# Patient Record
Sex: MALE | Race: WHITE
[De-identification: names, ages, dates, MRNs, and addresses within clinical notes are randomized per-mention and may not be internally consistent; named-entity substitution may affect disease eponyms.]

---

## 2018-03-23 ENCOUNTER — HOSPITAL ENCOUNTER (INPATIENT)
Dept: HOSPITAL 52 - LL.ED | Age: 48
LOS: 2 days | Discharge: HOME | DRG: 425 | End: 2018-03-25
Attending: FAMILY MEDICINE | Admitting: FAMILY MEDICINE
Payer: COMMERCIAL

## 2018-03-23 DIAGNOSIS — E87.1: Primary | ICD-10-CM

## 2018-03-23 DIAGNOSIS — Z79.899: ICD-10-CM

## 2018-03-23 DIAGNOSIS — F10.10: ICD-10-CM
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
## 2018-03-23 DIAGNOSIS — Z88.8: ICD-10-CM

## 2018-03-23 LAB
CHLORIDE SERPL-SCNC: 83 MMOL/L (ref 98–107)
CHLORIDE SERPL-SCNC: 85 MMOL/L (ref 98–107)
SODIUM SERPL-SCNC: 118 MMOL/L (ref 136–145)
SODIUM SERPL-SCNC: 119 MMOL/L (ref 136–145)

## 2018-03-23 PROCEDURE — G0480 DRUG TEST DEF 1-7 CLASSES: HCPCS

## 2018-03-23 RX ADMIN — ONDANSETRON PRN MG: 4 TABLET, ORALLY DISINTEGRATING ORAL at 17:36

## 2018-03-23 RX ADMIN — MOMETASONE FUROATE AND FORMOTEROL FUMARATE DIHYDRATE SCH PUFF: 100; 5 AEROSOL RESPIRATORY (INHALATION) at 17:36

## 2018-03-23 NOTE — EDM.PDOC
ED HPI GENERAL MEDICAL PROBLEM





- General


Chief Complaint: Neuro Symptoms/Deficits


Stated Complaint: thinks having a stroke L) arm numbness tingling


Time Seen by Provider: 03/23/18 11:24


Source of Information: Reports: Patient


History Limitations: Reports: Intoxication (question)





- History of Present Illness


INITIAL COMMENTS - FREE TEXT/NARRATIVE: 


Patient is a 48-year-old who was brought in private vehicle with chief 

complaint of left side arm numbness neck numbness and left leg numbness, 

patient states that he was loading the  when all this happened he did 

have some nausea, patient stated to the nurses that he thought he was having a 

stroke patient has history of Bell's palsy on the left side he has some 

weakness of lips and his tongue is slightly deviated to the left at this time 

he has negative drift strength is equal bilaterally speech is clear and 

ambulated into the hospital on his own power.


Symptoms started 1 hour prior to arrival


Onset: Sudden (This morning)


Duration: Improving


Location: Reports: Chest, Upper Extremity, Left, Lower Extremity, Left, 

Radiates to (arm left)


Quality: Reports: Other (Numbness)


Severity: Mild


Improves with: Reports: Rest


Worsens with: Reports: None


Context: Reports: Other


Associated Symptoms: Reports: Nausea/Vomiting





- Related Data


 Allergies











Allergy/AdvReac Type Severity Reaction Status Date / Time


 


naproxen [From Aleve] Allergy  Other Verified 03/23/18 11:37











Home Meds: 


 Home Meds





Celecoxib [CeleBREX] 200 mg PO DAILY 01/27/15 [History]


Cholecalciferol (Vitamin D3) [Vitamin D3] 2,000 units PO DAILY 01/27/15 [History

]


Cyanocobalamin (Vitamin B12) [Vitamin B12] 100 mg PO DAILY 01/27/15 [History]


Ferrous Sulfate [Iron] 325 mg PO DAILY 01/27/15 [History]


Hydrocodone/Acetaminophen [Hydrocodon-Acetaminophen 5-325] 1 - 2 tab PO Q6HR 

PRN 01/27/15 [History]


Multivitamin [Daily Vitamin] 1 tab PO DAILY 01/27/15 [History]


Omeprazole [Prilosec] 1 cap PO DAILY 03/13/15 [History]


ALPRAZolam [Alprazolam] 0.5 mg PO BID 09/16/16 [History]


Gabapentin [Neurontin] 600 mg PO TID 09/16/16 [History]


fentaNYL [Fentanyl] 1 each TD Q3D 09/16/16 [History]











Social & Family History





- Tobacco Use


Smoking Status *Q: Never Smoker


Second Hand Smoke Exposure: No





- Alcohol Use


Days Per Week of Alcohol Use: 3


Number of Drinks Per Day: 10


Total Drinks Per Week: 30





- Recreational Drug Use


Recreational Drug Use: No





ED ROS GENERAL





- Review of Systems


Review Of Systems: See Below


Constitutional: Reports: No Symptoms


HEENT: Reports: No Symptoms


Respiratory: Reports: No Symptoms (History of shortness of breath COPD)


Cardiovascular: Reports: Other (Chest pressure)


Endocrine: Reports: No Symptoms


GI/Abdominal: Reports: No Symptoms


: Reports: No Symptoms


Musculoskeletal: Reports: Neck Pain (Left), Shoulder Pain (Left), Hand Pain (

Left), Leg Pain (Left), Other (Described more of a numbness and pain)


Skin: Reports: No Symptoms


Neurological: Reports: Numbness (Neck left arm and and left leg), Change in 

Speech (States that at home he did have some slurred speech that was noticed by 

his son speech now is clear)


Psychiatric: Reports: No Symptoms


Hematologic/Lymphatic: Reports: No Symptoms


Immunologic: Reports: No Symptoms


Free Text/Narrative/Comment: 





Patient's symptoms seem to be improving they wax and wane





ED EXAM, NEURO





- Physical Exam


Exam: See Below


Exam Limited By: Intoxication (Spells like alcohol)


General Appearance: Alert, WD/WN, No Apparent Distress, Anxious, Mild Distress


Eye Exam: Bilateral Eye: EOMI, PERRL


Ears: Normal External Exam, Normal Canal, Hearing Grossly Normal, Normal TMs


Nose: Normal Inspection, Normal Mucosa, No Blood


Throat/Mouth: Normal Inspection, Normal Lips, Normal Teeth, Normal Gums, Normal 

Oropharynx, Normal Voice, No Airway Compromise


Head Exam: Atraumatic, Normocephalic


Neck: Normal Inspection, Supple, Non-Tender, Full Range of Motion


Respiratory/Chest: No Respiratory Distress, Lungs Clear, Normal Breath Sounds, 

No Accessory Muscle Use, Chest Non-Tender


Cardiovascular: Normal Peripheral Pulses, Regular Rate, Rhythm, No Edema, No 

Gallop, No JVD, No Murmur, No Rub


GI/Abdominal: Normal Bowel Sounds, Soft, Non-Tender, No Organomegaly, No 

Distention, No Abnormal Bruit, No Mass


 (Male) Exam: Deferred


Rectal (Males) Exam: Deferred


Neurological: Alert, Normal Mood/Affect, Normal Dorsiflexion, CN II-XII Intact, 

Normal Plantar Flexion, Normal Gait, Normal Reflexes, No Motor/Sensory Deficits

, Oriented x 3


Back Exam: Normal Inspection, Full Range of Motion, NT


Extremities: Normal Inspection, Normal Range of Motion, Non-Tender, No Pedal 

Edema, Normal Capillary Refill


Psychiatric: Normal Affect, Normal Mood


Skin Exam: Warm, Dry, Intact, Normal Color, No Rash





Course





- Orders/Labs/Meds


Orders: 


 Active Orders 24 hr











 Category Date Time Status


 


 EKG Documentation Completion [RC] ASDIRECTED Care  03/23/18 11:42 Active


 


 Chest 1V Frontal [CR] Stat Exams  03/23/18 11:43 Taken


 


 Head wo Cont [CT] Routine Exams  03/23/18 11:30 Taken


 


 CREATININE, URINE RAND/24 HR Stat Lab  03/23/18 12:23 Ordered


 


 OSMOLALITY - URINE Stat Lab  03/23/18 12:19 Ordered


 


 PROLACTIN [REF] Stat Lab  03/23/18 11:27 Received


 


 SODIUM, URINE RAND/24HR Stat Lab  03/23/18 12:19 Ordered


 


 Sodium Chloride 0.9% [Normal Saline] 1,000 ml Med  03/23/18 12:00 Active





 IV ASDIRECTED   








 Medication Orders





Sodium Chloride (Normal Saline)  1,000 mls @ 150 mls/hr IV ASDIRECTED TAISHA


   Last Admin: 03/23/18 12:05  Dose: 150 mls/hr








Labs: 


 Laboratory Tests











  03/23/18 03/23/18 03/23/18 Range/Units





  10:55 11:27 11:27 


 


WBC   3.3 L   (4.0-10.2)  K/uL


 


RBC   3.73 L   (4.33-5.41)  M/uL


 


Hgb   11.2 L D   (13.1-16.8)  g/dL


 


Hct   33.0 L   (39.0-49.0)  %


 


MCV   88.5  D   (84.0-98.0)  fL


 


MCH   30.0   (28.2-33.3)  pg


 


MCHC   33.9   (31.7-36.0)  g/dL


 


RDW   16.1 H   (11.2-14.1)  %


 


Plt Count   191   (150-350)  K/uL


 


Neut % (Auto)   55.2   (45.0-80.0)  %


 


Lymph % (Auto)   24.4   (10.0-50.0)  %


 


Mono % (Auto)   18.6 H   (2.0-14.0)  %


 


Eos % (Auto)   0.9   (0.0-5.0)  %


 


Baso % (Auto)   0.9   (0.0-2.0)  %


 


Neut # (Auto)   1.81   (1.40-7.00)  K/uL


 


Lymph # (Auto)   0.80   (0.50-3.50)  K/uL


 


Mono # (Auto)   0.61   (0.00-1.00)  K/uL


 


Eos # (Auto)   0.03   (0.00-0.50)  K/uL


 


Baso # (Auto)   0.03   (0.00-0.20)  K/uL


 


PT    10.3  (9.8-11.7)  SEC


 


INR    1.0  


 


APTT    29.3  (22.1-29.8)  SEC


 


D-Dimer, Quantitative     (0-400)  ng/mL


 


Sodium     (136-145)  mmol/L


 


Potassium     (3.5-5.1)  mmol/L


 


Chloride     ()  mmol/L


 


Carbon Dioxide     (21.0-32.0)  mmol/L


 


BUN     (7-18)  mg/dL


 


Creatinine     (0.51-1.17)  mg/dL


 


Est Cr Clr Drug Dosing     


 


Estimated GFR (MDRD)     mL/min


 


Glucose     ()  mg/dL


 


POC Glucose     ()  mg/dl


 


Calcium     (8.5-10.1)  mg/dL


 


Magnesium     (1.8-2.4)  mg/dL


 


Total Bilirubin     (0.2-1.0)  mg/dL


 


AST     (15-37)  U/L


 


ALT     (12-78)  U/L


 


Alkaline Phosphatase     ()  IU/L


 


Creatine Kinase     ()  U/L


 


Creatine Kinase Index     (0.0-2.5)  %


 


CK-MB (CK-2)     (0.00-3.60)  ng/mL


 


Troponin I     (0.000-0.056)  ng/mL


 


NT-Pro-B Natriuret Pep     (0-125)  pg/mL


 


Total Protein     (6.4-8.2)  g/dL


 


Albumin     (3.4-5.0)  g/dL


 


Ethyl Alcohol  0.228 H    (0.000-0.080)  g/dL














  03/23/18 03/23/18 03/23/18 Range/Units





  11:27 11:27 11:30 


 


WBC     (4.0-10.2)  K/uL


 


RBC     (4.33-5.41)  M/uL


 


Hgb     (13.1-16.8)  g/dL


 


Hct     (39.0-49.0)  %


 


MCV     (84.0-98.0)  fL


 


MCH     (28.2-33.3)  pg


 


MCHC     (31.7-36.0)  g/dL


 


RDW     (11.2-14.1)  %


 


Plt Count     (150-350)  K/uL


 


Neut % (Auto)     (45.0-80.0)  %


 


Lymph % (Auto)     (10.0-50.0)  %


 


Mono % (Auto)     (2.0-14.0)  %


 


Eos % (Auto)     (0.0-5.0)  %


 


Baso % (Auto)     (0.0-2.0)  %


 


Neut # (Auto)     (1.40-7.00)  K/uL


 


Lymph # (Auto)     (0.50-3.50)  K/uL


 


Mono # (Auto)     (0.00-1.00)  K/uL


 


Eos # (Auto)     (0.00-0.50)  K/uL


 


Baso # (Auto)     (0.00-0.20)  K/uL


 


PT     (9.8-11.7)  SEC


 


INR     


 


APTT     (22.1-29.8)  SEC


 


D-Dimer, Quantitative  370    (0-400)  ng/mL


 


Sodium   118 L*   (136-145)  mmol/L


 


Potassium   4.7   (3.5-5.1)  mmol/L


 


Chloride   83 L   ()  mmol/L


 


Carbon Dioxide   23.6   (21.0-32.0)  mmol/L


 


BUN   9   (7-18)  mg/dL


 


Creatinine   0.69   (0.51-1.17)  mg/dL


 


Est Cr Clr Drug Dosing   TNP   


 


Estimated GFR (MDRD)   > 60   mL/min


 


Glucose   91   ()  mg/dL


 


POC Glucose    82  ()  mg/dl


 


Calcium   8.5   (8.5-10.1)  mg/dL


 


Magnesium   1.8   (1.8-2.4)  mg/dL


 


Total Bilirubin   0.5   (0.2-1.0)  mg/dL


 


AST   138 H   (15-37)  U/L


 


ALT   98 H   (12-78)  U/L


 


Alkaline Phosphatase   65   ()  IU/L


 


Creatine Kinase   47   ()  U/L


 


Creatine Kinase Index   2.8 H   (0.0-2.5)  %


 


CK-MB (CK-2)   1.30   (0.00-3.60)  ng/mL


 


Troponin I   0.000   (0.000-0.056)  ng/mL


 


NT-Pro-B Natriuret Pep   32   (0-125)  pg/mL


 


Total Protein   8.4 H   (6.4-8.2)  g/dL


 


Albumin   3.8   (3.4-5.0)  g/dL


 


Ethyl Alcohol     (0.000-0.080)  g/dL











Meds: 


Medications











Generic Name Dose Route Start Last Admin





  Trade Name Freq  PRN Reason Stop Dose Admin


 


Sodium Chloride  1,000 mls @ 150 mls/hr  03/23/18 12:00  03/23/18 12:05





  Normal Saline  IV   150 mls/hr





  ASDIRECTED TAISHA   Administration














Discontinued Medications














Generic Name Dose Route Start Last Admin





  Trade Name Freq  PRN Reason Stop Dose Admin


 


Aspirin  324 mg  03/23/18 11:41  03/23/18 11:44





  Aspirin  PO  03/23/18 11:42  324 mg





  ONETIME ONE   Administration


 


Aspirin  Confirm  03/23/18 11:42  03/23/18 11:48





  Aspirin  Administered  03/23/18 11:43  Not Given





  Dose   





  324 mg   





  .ROUTE   





  .STK-MED ONE   














Departure





- Departure


Time of Disposition: 12:32


Disposition: Admitted As Inpatient 66


Clinical Impression: 


 Hyponatremia








- Discharge Information





- Problem List & Annotations


(1) Hyponatremia


SNOMED Code(s): 18206404


   Code(s): E87.1 - HYPO-OSMOLALITY AND HYPONATREMIA   Status: Acute   Current 

Visit: Yes   





- Problem List Review


Problem List Initiated/Reviewed/Updated: Yes





- My Orders


Last 24 Hours: 


My Active Orders





03/23/18 11:27


PROLACTIN [REF] Stat 





03/23/18 11:30


Head wo Cont [CT] Routine 





03/23/18 11:42


EKG Documentation Completion [RC] ASDIRECTED 





03/23/18 11:43


Chest 1V Frontal [CR] Stat 





03/23/18 12:00


Sodium Chloride 0.9% [Normal Saline] 1,000 ml IV ASDIRECTED 





03/23/18 12:19


OSMOLALITY - URINE Stat 


SODIUM, URINE RAND/24HR Stat 





03/23/18 12:23


CREATININE, URINE RAND/24 HR Stat 














- Assessment/Plan


Admission H&P: Please use this note as an admission H&P


Last 24 Hours: 


My Active Orders





03/23/18 11:27


PROLACTIN [REF] Stat 





03/23/18 11:30


Head wo Cont [CT] Routine 





03/23/18 11:42


EKG Documentation Completion [RC] ASDIRECTED 





03/23/18 11:43


Chest 1V Frontal [CR] Stat 





03/23/18 12:00


Sodium Chloride 0.9% [Normal Saline] 1,000 ml IV ASDIRECTED 





03/23/18 12:19


OSMOLALITY - URINE Stat 


SODIUM, URINE RAND/24HR Stat 





03/23/18 12:23


CREATININE, URINE RAND/24 HR Stat 











Plan: 





Admit patient for corrections of sodium and observation.

## 2018-03-24 LAB
CHLORIDE SERPL-SCNC: 90 MMOL/L (ref 98–107)
CHLORIDE SERPL-SCNC: 94 MMOL/L (ref 98–107)
SODIUM SERPL-SCNC: 126 MMOL/L (ref 136–145)
SODIUM SERPL-SCNC: 129 MMOL/L (ref 136–145)

## 2018-03-24 RX ADMIN — MOMETASONE FUROATE AND FORMOTEROL FUMARATE DIHYDRATE SCH PUFF: 100; 5 AEROSOL RESPIRATORY (INHALATION) at 17:16

## 2018-03-24 RX ADMIN — MOMETASONE FUROATE AND FORMOTEROL FUMARATE DIHYDRATE SCH PUFF: 100; 5 AEROSOL RESPIRATORY (INHALATION) at 07:59

## 2018-03-24 RX ADMIN — ONDANSETRON PRN MG: 4 TABLET, ORALLY DISINTEGRATING ORAL at 07:50

## 2018-03-24 RX ADMIN — VITAMIN D, TAB 1000IU (100/BT) SCH UNITS: 25 TAB at 07:45

## 2018-03-24 NOTE — PCM.PN
- General Info


Date of Service: 03/24/18


Admission Dx/Problem (Free Text): 





Patient admitted with hyponatremia severe treated and sodium improving


Functional Status: Reports: Ambulating





- Review of Systems


General: Reports: Other (Tremors)


HEENT: Reports: No Symptoms


Pulmonary: Reports: No Symptoms


Cardiovascular: Reports: No Symptoms


Gastrointestinal: Reports: No Symptoms


Genitourinary: Reports: No Symptoms


Musculoskeletal: Reports: No Symptoms


Skin: Reports: No Symptoms


Neurological: Reports: Tremors (Secondary to DTs)


Psychiatric: Reports: No Symptoms





- Patient Data


Vitals - Most Recent: 


 Last Vital Signs











Temp  98.3 F   03/24/18 04:00


 


Pulse  102 H  03/24/18 04:00


 


Resp  20   03/24/18 04:00


 


BP  141/76 H  03/24/18 04:00


 


Pulse Ox  96   03/24/18 04:00











Weight - Most Recent: 350 lb


I&O - Last 24 Hours: 


 Intake & Output











 03/23/18 03/24/18 03/24/18





 22:59 06:59 14:59


 


Intake Total 200 2071 


 


Output Total 1250 825 


 


Balance -1050 1246 











Lab Results Last 24 Hours: 


 Laboratory Results - last 24 hr











  03/23/18 03/23/18 03/24/18 Range/Units





  15:50 20:32 07:30 


 


WBC    3.8 L  (4.0-10.2)  K/uL


 


RBC    3.52 L  (4.33-5.41)  M/uL


 


Hgb    10.5 L  (13.1-16.8)  g/dL


 


Hct    31.6 L  (39.0-49.0)  %


 


MCV    89.8  (84.0-98.0)  fL


 


MCH    29.8  (28.2-33.3)  pg


 


MCHC    33.2  (31.7-36.0)  g/dL


 


RDW    16.5 H  (11.2-14.1)  %


 


Plt Count    155  (150-350)  K/uL


 


Neut % (Auto)    81.1 H  (45.0-80.0)  %


 


Lymph % (Auto)    6.9 L  (10.0-50.0)  %


 


Mono % (Auto)    11.4  (2.0-14.0)  %


 


Eos % (Auto)    0.3  (0.0-5.0)  %


 


Baso % (Auto)    0.3  (0.0-2.0)  %


 


Neut # (Auto)    3.06  (1.40-7.00)  K/uL


 


Lymph # (Auto)    0.26 L  (0.50-3.50)  K/uL


 


Mono # (Auto)    0.43  (0.00-1.00)  K/uL


 


Eos # (Auto)    0.01  (0.00-0.50)  K/uL


 


Baso # (Auto)    0.01  (0.00-0.20)  K/uL


 


Sodium  119 L*    (136-145)  mmol/L


 


Potassium  4.6    (3.5-5.1)  mmol/L


 


Chloride  85 L    ()  mmol/L


 


Carbon Dioxide  21.3    (21.0-32.0)  mmol/L


 


BUN  11    (7-18)  mg/dL


 


Creatinine  0.75    (0.51-1.17)  mg/dL


 


Est Cr Clr Drug Dosing  132.21    mL/min


 


Estimated GFR (MDRD)  > 60    mL/min


 


Glucose  83    ()  mg/dL


 


Calcium  8.2 L    (8.5-10.1)  mg/dL


 


Troponin I   0.000   (0.000-0.056)  ng/mL














  03/24/18 Range/Units





  07:30 


 


WBC   (4.0-10.2)  K/uL


 


RBC   (4.33-5.41)  M/uL


 


Hgb   (13.1-16.8)  g/dL


 


Hct   (39.0-49.0)  %


 


MCV   (84.0-98.0)  fL


 


MCH   (28.2-33.3)  pg


 


MCHC   (31.7-36.0)  g/dL


 


RDW   (11.2-14.1)  %


 


Plt Count   (150-350)  K/uL


 


Neut % (Auto)   (45.0-80.0)  %


 


Lymph % (Auto)   (10.0-50.0)  %


 


Mono % (Auto)   (2.0-14.0)  %


 


Eos % (Auto)   (0.0-5.0)  %


 


Baso % (Auto)   (0.0-2.0)  %


 


Neut # (Auto)   (1.40-7.00)  K/uL


 


Lymph # (Auto)   (0.50-3.50)  K/uL


 


Mono # (Auto)   (0.00-1.00)  K/uL


 


Eos # (Auto)   (0.00-0.50)  K/uL


 


Baso # (Auto)   (0.00-0.20)  K/uL


 


Sodium  126 L  (136-145)  mmol/L


 


Potassium  4.6  (3.5-5.1)  mmol/L


 


Chloride  90 L  ()  mmol/L


 


Carbon Dioxide  27.3  (21.0-32.0)  mmol/L


 


BUN  12  (7-18)  mg/dL


 


Creatinine  0.72  (0.51-1.17)  mg/dL


 


Est Cr Clr Drug Dosing  137.72  mL/min


 


Estimated GFR (MDRD)  > 60  mL/min


 


Glucose  98  ()  mg/dL


 


Calcium  8.5  (8.5-10.1)  mg/dL


 


Troponin I   (0.000-0.056)  ng/mL











Med Orders - Current: 


 Current Medications





Albuterol (Proventil Hfa)  2 puff INH Q4HR PRN


   PRN Reason: sob


Amitriptyline HCl (Elavil)  10 mg PO BEDTIME [facility]


   Last Admin: 03/23/18 20:32 Dose:  10 mg


Chlordiazepoxide HCl (Librium)  25 mg PO QID [facility]


   Last Admin: 03/24/18 07:45 Dose:  25 mg


Cholecalciferol (Vitamin D3)  2,000 units PO DAILY [facility]


   Last Admin: 03/24/18 07:45 Dose:  2,000 units


Cyanocobalamin (Vitamin B12)  1,000 mcg PO DAILY [facility]


   Last Admin: 03/24/18 07:45 Dose:  1,000 mcg


Cyclobenzaprine HCl (Flexeril)  5 mg PO TID PRN


   PRN Reason: spasm,pain


Enoxaparin Sodium (Lovenox)  40 mg SUBCUT DAILY [facility]


   Last Admin: 03/24/18 07:46 Dose:  40 mg


Escitalopram Oxalate (Lexapro)  20 mg PO DAILY [facility]


   Last Admin: 03/24/18 07:46 Dose:  20 mg


Folic Acid (Folic Acid)  1 mg PO BEDTIME [facility]


   Last Admin: 03/23/18 20:31 Dose:  1 mg


Furosemide (Lasix)  40 mg IVPUSH DAILY [facility]


   Last Admin: 03/24/18 07:46 Dose:  40 mg


Sodium Chloride (Normal Saline)  1,000 mls @ 150 mls/hr IV ASDIRECTED [facility]


   Last Admin: 03/24/18 02:22 Dose:  150 mls/hr


Mometasone Furoate/Formoterol Fumar (Dulera 100-5 Mcg)  2 puff IH BID [facility]


   Last Admin: 03/24/18 07:59 Dose:  2 puff


Multivitamins/Minerals/Vitamin C (Tab-A-Sudha)  1 tab PO DAILY [facility]


   Last Admin: 03/24/18 07:45 Dose:  1 tab


Olopatadine HCl (Patanol 0.1% Ophth Soln)  0 ml EYEBOTH Q8H PRN


   PRN Reason: irritation


Omeprazole (Omeprazole)  20 mg PO DAILY PRN


   PRN Reason: acid reflex


   Last Admin: 03/24/18 07:50 Dose:  20 mg


Ondansetron HCl (Zofran Odt)  4 mg PO Q8H PRN


   PRN Reason: nausea


   Last Admin: 03/24/18 07:50 Dose:  4 mg


Thiamine HCl (Vitamin B-1)  100 mg PO BEDTIME [facility]


   Last Admin: 03/23/18 20:32 Dose:  100 mg


Tramadol HCl (Ultram)  50 mg PO Q8HR PRN


   PRN Reason: Pain





Discontinued Medications





Aspirin (Aspirin)  324 mg PO ONETIME ONE


   Stop: 03/23/18 11:42


   Last Admin: 03/23/18 11:44 Dose:  324 mg


Aspirin (Aspirin) Confirm Administered Dose 324 mg .ROUTE .STK-MED ONE


   Stop: 03/23/18 11:43


   Last Admin: 03/23/18 11:48 Dose:  Not Given


Furosemide (Lasix)  40 mg IVPUSH ONETIME ONE


   Stop: 03/23/18 12:31


   Last Admin: 03/23/18 12:58 Dose:  40 mg


Furosemide (Lasix) Confirm Administered Dose 40 mg .ROUTE .STK-MED ONE


   Stop: 03/23/18 12:56


   Last Admin: 03/23/18 13:03 Dose:  Not Given


Sodium Chloride (Normal Saline)  1,000 mls @ 150 mls/hr IV ASDIRECTED [facility]


Lorazepam (Ativan)  2 mg IVPUSH ONETIME ONE


   Stop: 03/23/18 20:56


   Last Admin: 03/23/18 21:23 Dose:  2 mg











- Problem List & Annotations


(1) Hyponatremia


SNOMED Code(s): 65563663


   Code(s): E87.1 - HYPO-OSMOLALITY AND HYPONATREMIA   Status: Acute   Current 

Visit: Yes   Annotation/Comment:: We'll continue correcting electrolytes sodium 

today 126 patient feels much better   





(2) Alcohol abuse


SNOMED Code(s): 51182379


   Code(s): F10.10 - ALCOHOL ABUSE, UNCOMPLICATED   Status: Acute   Current 

Visit: Yes   Annotation/Comment:: During the night patient had increase tremors 

history reveals use of alcohol almost on a daily basis patient denies any 

previous seizure activity patient started on vitamins folic acid and thiamine 

also on Librium and Ativan when necessary   





- Problem List Review


Problem List Initiated/Reviewed/Updated: Yes





- My Orders


Last 24 Hours: 


My Active Orders





03/23/18 11:27


OSMOLALITY - SERUM [REF] Stat 





03/23/18 12:30


Telemetry Monitoring [Cardiac Monitoring] [RC] Q2HR 





03/23/18 12:45


Patient Status [ADT] Routine 


Vital Signs [RC] Q4HR 





03/23/18 12:51


Patient Status [ADT] Routine 


Ambulate [RC] ASDIRECTED 


May Shower [RC] ASDIRECTED 


Up With Assistance [RC] ASDIRECTED 


DVT/VTE Prophylaxis Reflex [OM.PC] Routine 





03/23/18 12:52


Intake and Output [RC] QSHIFT 





03/23/18 12:53


Oxygen Therapy [RC] PRN 


Pulse Oximetry [RC] PRN 





03/23/18 12:55


Antiembolic Devices [RC] 08,20 


VTE/DVT Education [RC] PER UNIT ROUTINE 





03/23/18 13:00


Enoxaparin [Lovenox]   40 mg SUBCUT DAILY 





03/23/18 13:01


Albuterol [Proventil HFA]   2 puff INH Q4HR PRN 


Cyclobenzaprine [Flexeril]   5 mg PO TID PRN 


Olopatadine [Patanol 0.1% Ophth Soln]   0 ml EYEBOTH Q8H PRN 


Omeprazole   20 mg PO DAILY PRN 


traMADol [Ultram]   50 mg PO Q8HR PRN 





03/23/18 13:07


Communication Order [RC] 08,14,20 





03/23/18 13:11


Code Status [Resuscitation Status] Routine 





03/23/18 13:30


Ondansetron [Zofran ODT]   4 mg PO Q8H PRN 





03/23/18 18:00


Mometasone/Formoterol [Dulera 100-5 MCG]   2 puff IH BID 





03/23/18 20:00


Amitriptyline [Elavil]   10 mg PO BEDTIME 


Folic Acid   1 mg PO BEDTIME 


Thiamine [Vitamin B-1]   100 mg PO BEDTIME 


chlordiazePOXIDE [Librium]   25 mg PO QID 





03/23/18 20:18


EKG 12 Lead [EK] Routine 





03/23/18 20:46


EKG Documentation Completion [RC] ASDIRECTED 





03/23/18 Lunch


Regular Diet [DIET] 





03/24/18 08:00


Cholecalciferol (Vitamin D3) [Vitamin D3]   2,000 units PO DAILY 


Cyanocobalamin (Vitamin B12) [Vitamin B12]   1,000 mcg PO DAILY 


Escitalopram [Lexapro]   20 mg PO DAILY 


Furosemide [Lasix]   40 mg IVPUSH DAILY 


Multivitamins [Tab-A-Sudha]   1 tab PO DAILY 














- Assessment


Assessment:: 





Overall patient improving will continue replacing sodium will do BMP at 8 PM 

today

## 2018-03-25 VITALS — SYSTOLIC BLOOD PRESSURE: 139 MMHG | DIASTOLIC BLOOD PRESSURE: 89 MMHG

## 2018-03-25 LAB
CHLORIDE SERPL-SCNC: 97 MMOL/L (ref 98–107)
SODIUM SERPL-SCNC: 132 MMOL/L (ref 136–145)

## 2018-03-25 RX ADMIN — MOMETASONE FUROATE AND FORMOTEROL FUMARATE DIHYDRATE SCH PUFF: 100; 5 AEROSOL RESPIRATORY (INHALATION) at 08:47

## 2018-03-25 RX ADMIN — VITAMIN D, TAB 1000IU (100/BT) SCH UNITS: 25 TAB at 08:39

## 2018-03-25 NOTE — [id number]
**Discharge Summary





- Hospital Course


Free Text/Narrative:: 





Patient is a 48-year-old who was brought in to the ER for evaluation admitted 

to the hospital secondary to hyponatremia alcohol abuse his sodium is up I will 

send him home today





- Discharge Data


Discharge Date: 03/25/18


Discharge Disposition: Home, Self-Care 01


Condition: Fair





- Discharge Diagnosis/Problem(s)


(1) Hyponatremia


SNOMED Code(s): 29756725


   ICD Code: E87.1 - HYPO-OSMOLALITY AND HYPONATREMIA   Status: Acute   Current 

Visit: Yes   Problem Details: Sodium improved 132 although still low we'll send 

him home on a free salt diet and limited water intake to 1500 a day   





(2) Alcohol abuse


SNOMED Code(s): 07535629


   ICD Code: F10.10 - ALCOHOL ABUSE, UNCOMPLICATED   Status: Acute   Current 

Visit: Yes   Problem Details: Patient's sodium up to 132 patient feeling better 

we'll discharge home on a free salt diet and limit water intake to 1500 and 

also explained to the patient that he should consider stop drinking beer.   





- Discharge Plan


Home Medications: 


 Home Meds





Cholecalciferol (Vitamin D3) [Vitamin D3] 2,000 units PO DAILY 01/27/15 [History

]


Ferrous Sulfate [Iron] 325 mg PO DAILY 01/27/15 [History]


Multivitamin [Daily Vitamin] 1 tab PO DAILY 01/27/15 [History]


Omeprazole [Prilosec] 1 cap PO DAILY PRN 03/13/15 [History]


ALPRAZolam [Alprazolam] 0.5 mg PO BID PRN 09/16/16 [History]


Albuterol [Proair HFA] 2 puff INH Q4HR PRN 03/23/18 [History]


Amitriptyline [Elavil] 10 mg PO BEDTIME 03/23/18 [History]


Budesonide/Formoterol Fumarate [Symbicort 80-4.5 Mcg Inhaler] 2 inh INH BID 03/ 23/18 [History]


Cyanocobalamin (Vitamin B-12) [B-12] 1,000 mcg PO DAILY 03/23/18 [History]


Cyclobenzaprine [Flexeril] 5 mg PO TID PRN 03/23/18 [History]


Diclofenac Sodium [Voltaren] 1 applic TD DAILY 03/23/18 [History]


Escitalopram [Lexapro] 20 mg PO DAILY 03/23/18 [History]


Lisinopril/Hydrochlorothiazide [Lisinopril-Hctz 20-25 mg Tab] 1 each PO DAILY 03 /23/18 [History]


Olopatadine [Patanol 0.1% Ophth Soln] 1 drop EYEBOTH Q8HR PRN 03/23/18 [History]


Ondansetron 4 mg PO Q8H PRN 03/23/18 [History]


traMADol [Ultram] 50 mg PO Q8HR PRN 03/23/18 [History]








Forms:  ED Department Discharge


Referrals: 


Theodora Kruger PA-C [Primary Care Provider] - 





- Discharge Summary/Plan Comment


DC Time >30 min.: No


Discharge Summary/Plan Comment: 





Patient will be sent home on a free salt diet and limit water to 1500 mL he 

should take his thiamine and his folate acid at home secondary to possible 

seizures and withdrawal from alcohol.





- General Info


Date of Service: 03/25/18


Functional Status: Reports: Tolerating Diet





- Review of Systems


General: Reports: No Symptoms


HEENT: Reports: No Symptoms


Pulmonary: Reports: No Symptoms


Cardiovascular: Reports: No Symptoms


Gastrointestinal: Reports: No Symptoms


Genitourinary: Reports: No Symptoms


Musculoskeletal: Reports: No Symptoms


Skin: Reports: No Symptoms


Neurological: Reports: No Symptoms


Psychiatric: Reports: No Symptoms





- Patient Data


Vitals - Most Recent: 


 Last Vital Signs











Temp  98.5 F   03/25/18 08:00


 


Pulse  86   03/25/18 08:00


 


Resp  17   03/25/18 08:00


 


BP  128/76   03/25/18 08:00


 


Pulse Ox  98   03/25/18 08:00











Weight - Most Recent: 350 lb


I&O - Last 24 hours: 


 Intake & Output











 03/24/18 03/25/18 03/25/18





 22:59 06:59 14:59


 


Intake Total 1280 1594 720


 


Output Total 


 


Balance 780 1094 -580











Lab Results - Last 24 hrs: 


 Laboratory Results - last 24 hr











  03/24/18 03/25/18 03/25/18 Range/Units





  19:36 08:05 08:05 


 


WBC   2.8 L   (4.0-10.2)  K/uL


 


RBC   3.35 L   (4.33-5.41)  M/uL


 


Hgb   10.1 L   (13.1-16.8)  g/dL


 


Hct   31.0 L   (39.0-49.0)  %


 


MCV   92.5   (84.0-98.0)  fL


 


MCH   30.1   (28.2-33.3)  pg


 


MCHC   32.6   (31.7-36.0)  g/dL


 


RDW   17.2 H   (11.2-14.1)  %


 


Plt Count   149 L   (150-350)  K/uL


 


Neut % (Auto)   74.3   (45.0-80.0)  %


 


Lymph % (Auto)   13.0   (10.0-50.0)  %


 


Mono % (Auto)   10.5   (2.0-14.0)  %


 


Eos % (Auto)   1.8   (0.0-5.0)  %


 


Baso % (Auto)   0.4   (0.0-2.0)  %


 


Neut # (Auto)   2.06   (1.40-7.00)  K/uL


 


Lymph # (Auto)   0.36 L   (0.50-3.50)  K/uL


 


Mono # (Auto)   0.29   (0.00-1.00)  K/uL


 


Eos # (Auto)   0.05   (0.00-0.50)  K/uL


 


Baso # (Auto)   0.01   (0.00-0.20)  K/uL


 


Sodium  129 L   132 L  (136-145)  mmol/L


 


Potassium  4.3   4.3  (3.5-5.1)  mmol/L


 


Chloride  94 L   97 L  ()  mmol/L


 


Carbon Dioxide  28.5   26.1  (21.0-32.0)  mmol/L


 


BUN  15   12  (7-18)  mg/dL


 


Creatinine  0.92   0.76  (0.51-1.17)  mg/dL


 


Est Cr Clr Drug Dosing  107.78   130.47  mL/min


 


Estimated GFR (MDRD)  > 60   > 60  mL/min


 


Glucose  96   108 H  ()  mg/dL


 


Calcium  8.2 L   8.5  (8.5-10.1)  mg/dL


 


AST     (15-37)  U/L


 


ALT     (12-78)  U/L


 


Alkaline Phosphatase     ()  IU/L














  03/25/18 Range/Units





  08:05 


 


WBC   (4.0-10.2)  K/uL


 


RBC   (4.33-5.41)  M/uL


 


Hgb   (13.1-16.8)  g/dL


 


Hct   (39.0-49.0)  %


 


MCV   (84.0-98.0)  fL


 


MCH   (28.2-33.3)  pg


 


MCHC   (31.7-36.0)  g/dL


 


RDW   (11.2-14.1)  %


 


Plt Count   (150-350)  K/uL


 


Neut % (Auto)   (45.0-80.0)  %


 


Lymph % (Auto)   (10.0-50.0)  %


 


Mono % (Auto)   (2.0-14.0)  %


 


Eos % (Auto)   (0.0-5.0)  %


 


Baso % (Auto)   (0.0-2.0)  %


 


Neut # (Auto)   (1.40-7.00)  K/uL


 


Lymph # (Auto)   (0.50-3.50)  K/uL


 


Mono # (Auto)   (0.00-1.00)  K/uL


 


Eos # (Auto)   (0.00-0.50)  K/uL


 


Baso # (Auto)   (0.00-0.20)  K/uL


 


Sodium   (136-145)  mmol/L


 


Potassium   (3.5-5.1)  mmol/L


 


Chloride   ()  mmol/L


 


Carbon Dioxide   (21.0-32.0)  mmol/L


 


BUN   (7-18)  mg/dL


 


Creatinine   (0.51-1.17)  mg/dL


 


Est Cr Clr Drug Dosing   mL/min


 


Estimated GFR (MDRD)   mL/min


 


Glucose   ()  mg/dL


 


Calcium   (8.5-10.1)  mg/dL


 


AST  235 H  (15-37)  U/L


 


ALT  147 H  (12-78)  U/L


 


Alkaline Phosphatase  53  ()  IU/L











Med Orders - Current: 


 Current Medications





Albuterol (Proventil Hfa)  2 puff INH Q4HR PRN


   PRN Reason: sob


Amitriptyline HCl (Elavil)  10 mg PO BEDTIME [facility]


   Last Admin: 03/24/18 21:05 Dose:  10 mg


Chlordiazepoxide HCl (Librium)  25 mg PO QID [facility]


   Last Admin: 03/25/18 08:39 Dose:  25 mg


Cholecalciferol (Vitamin D3)  2,000 units PO DAILY [facility]


   Last Admin: 03/25/18 08:39 Dose:  2,000 units


Cyanocobalamin (Vitamin B12)  1,000 mcg PO DAILY [facility]


   Last Admin: 03/25/18 08:50 Dose:  1,000 mcg


Cyclobenzaprine HCl (Flexeril)  5 mg PO TID PRN


   PRN Reason: spasm,pain


   Last Admin: 03/24/18 12:33 Dose:  5 mg


Enoxaparin Sodium (Lovenox)  40 mg SUBCUT DAILY [facility]


   Last Admin: 03/25/18 08:40 Dose:  40 mg


Escitalopram Oxalate (Lexapro)  20 mg PO DAILY [facility]


   Last Admin: 03/25/18 08:47 Dose:  20 mg


Folic Acid (Folic Acid)  1 mg PO BEDTIME [facility]


   Last Admin: 03/24/18 21:05 Dose:  1 mg


Furosemide (Lasix)  40 mg IVPUSH DAILY [facility]


   Last Admin: 03/25/18 08:39 Dose:  40 mg


Sodium Chloride (Normal Saline)  1,000 mls @ 150 mls/hr IV ASDIRECTED [facility]


   Last Admin: 03/25/18 08:40 Dose:  150 mls/hr


Mometasone Furoate/Formoterol Fumar (Dulera 100-5 Mcg)  2 puff IH BID [facility]


   Last Admin: 03/25/18 08:47 Dose:  2 puff


Multivitamins/Minerals/Vitamin C (Tab-A-Sudha)  1 tab PO DAILY [facility]


   Last Admin: 03/25/18 08:39 Dose:  1 tab


Olopatadine HCl (Patanol 0.1% Ophth Soln)  0 ml EYEBOTH Q8H PRN


   PRN Reason: irritation


Omeprazole (Omeprazole)  20 mg PO DAILY PRN


   PRN Reason: acid reflex


   Last Admin: 03/24/18 07:50 Dose:  20 mg


Ondansetron HCl (Zofran Odt)  4 mg PO Q8H PRN


   PRN Reason: nausea


   Last Admin: 03/24/18 07:50 Dose:  4 mg


Thiamine HCl (Vitamin B-1)  100 mg PO BEDTIME [facility]


   Last Admin: 03/24/18 21:05 Dose:  100 mg


Tramadol HCl (Ultram)  50 mg PO Q8HR PRN


   PRN Reason: Pain


   Last Admin: 03/24/18 12:33 Dose:  50 mg





Discontinued Medications





Aspirin (Aspirin)  324 mg PO ONETIME ONE


   Stop: 03/23/18 11:42


   Last Admin: 03/23/18 11:44 Dose:  324 mg


Aspirin (Aspirin) Confirm Administered Dose 324 mg .ROUTE .STK-MED ONE


   Stop: 03/23/18 11:43


   Last Admin: 03/23/18 11:48 Dose:  Not Given


Cyanocobalamin (Vitamin B12)  1,000 mcg PO DAILY [facility]


   Last Admin: 03/25/18 08:49 Dose:  Not Given


Furosemide (Lasix)  40 mg IVPUSH ONETIME ONE


   Stop: 03/23/18 12:31


   Last Admin: 03/23/18 12:58 Dose:  40 mg


Furosemide (Lasix) Confirm Administered Dose 40 mg .ROUTE .STK-MED ONE


   Stop: 03/23/18 12:56


   Last Admin: 03/23/18 13:03 Dose:  Not Given


Sodium Chloride (Normal Saline)  1,000 mls @ 150 mls/hr IV ASDIRECTED [facility]


Lorazepam (Ativan)  2 mg IVPUSH ONETIME ONE


   Stop: 03/23/18 20:56


   Last Admin: 03/23/18 21:23 Dose:  2 mg











- Exam


General: Reports: Alert, Oriented


HEENT: Reports: Pupils Equal, Pupils Reactive, EOMI, Mucous Membr. Moist/Pink


Neck: Reports: Supple


Lungs: Reports: Clear to Auscultation, Normal Respiratory Effort


Cardiovascular: Reports: Regular Rate, Regular Rhythm


GI/Abdominal Exam: Normal Bowel Sounds, Soft, Non-Tender, No Organomegaly, No 

Distention, No Abnormal Bruit, No Mass, Pelvis Stable


 (Male) Exam: Deferred


Rectal (Males) Exam: Deferred


Back Exam: Reports: Normal Inspection, Full Range of Motion


Extremities: Normal Inspection, Normal Range of Motion, Non-Tender, No Pedal 

Edema, Normal Capillary Refill


Skin: Reports: Warm, Dry, Intact


Neurological: Reports: No New Focal Deficit





*Q Meaningful Use (DIS)





- VTE *Q


VTE Criteria *Q: 








- Stroke *Q


Stroke Criteria *Q: 








- AMI *Q


AMI Criteria *Q:

## 2019-03-26 ENCOUNTER — HOSPITAL ENCOUNTER (EMERGENCY)
Dept: HOSPITAL 52 - LL.ED | Age: 49
Discharge: HOME | End: 2019-03-26
Payer: COMMERCIAL

## 2019-03-26 VITALS — SYSTOLIC BLOOD PRESSURE: 117 MMHG | DIASTOLIC BLOOD PRESSURE: 90 MMHG

## 2019-03-26 VITALS — DIASTOLIC BLOOD PRESSURE: 85 MMHG | SYSTOLIC BLOOD PRESSURE: 135 MMHG

## 2019-03-26 DIAGNOSIS — E83.42: ICD-10-CM

## 2019-03-26 DIAGNOSIS — G47.30: ICD-10-CM

## 2019-03-26 DIAGNOSIS — N28.9: ICD-10-CM

## 2019-03-26 DIAGNOSIS — T78.3XXA: Primary | ICD-10-CM

## 2019-03-26 DIAGNOSIS — F41.9: ICD-10-CM

## 2019-03-26 DIAGNOSIS — G43.009: Primary | ICD-10-CM

## 2019-03-26 DIAGNOSIS — Z88.8: ICD-10-CM

## 2019-03-26 DIAGNOSIS — Z71.6: ICD-10-CM

## 2019-03-26 DIAGNOSIS — M19.90: ICD-10-CM

## 2019-03-26 DIAGNOSIS — Z79.899: ICD-10-CM

## 2019-03-26 DIAGNOSIS — J43.1: ICD-10-CM

## 2019-03-26 DIAGNOSIS — D63.1: ICD-10-CM

## 2019-03-26 DIAGNOSIS — Z77.22: ICD-10-CM

## 2019-03-26 DIAGNOSIS — I10: ICD-10-CM

## 2019-03-26 DIAGNOSIS — I50.9: ICD-10-CM

## 2019-03-26 DIAGNOSIS — M15.0: ICD-10-CM

## 2019-03-26 DIAGNOSIS — F32.9: ICD-10-CM

## 2019-03-26 DIAGNOSIS — I13.0: ICD-10-CM

## 2019-03-26 DIAGNOSIS — N18.9: ICD-10-CM

## 2019-03-26 DIAGNOSIS — K27.9: ICD-10-CM

## 2019-03-26 LAB
CHLORIDE SERPL-SCNC: 95 MMOL/L (ref 98–107)
SODIUM SERPL-SCNC: 129 MMOL/L (ref 136–145)

## 2019-03-26 RX ADMIN — Medication PRN ML: at 21:18

## 2019-03-26 RX ADMIN — Medication PRN ML: at 20:41

## 2019-03-26 NOTE — EDM.PDOC
ED HPI GENERAL MEDICAL PROBLEM





- General


Chief Complaint: General


Stated Complaint: throat feels like it is swelling


Time Seen by Provider: 03/26/19 11:03


Source of Information: Reports: Family (Wife), Old Records ([facility] EMR.  No paper hospital chart available.), Other ([facility])


History Limitations: Reports: No Limitations





- History of Present Illness


INITIAL COMMENTS - FREE TEXT/NARRATIVE: 





The patient was brought to the emergency room via private automobile by his 

wife for evaluation of increasing dysphagia and difficulty swallowing secondary 

to unknown type of allergic reaction with patient awakening with these symptoms 

at about 10 AM this morning. He did take 1 dose of Flexeril at that time with 

no improvement in symptoms. Note that the patient did not take his morning 

medications secondary to his dysphagia. He has had a similar reaction at age 10 

with no other previous episodes of angioedema. He denies any recent change in 

allergen exposure, including foods, new clothing, detergents, etc. The patient 

denies any chest pain/pressure, heart flutter, dizziness, orthostasis, orthopnea

, diaphoresis, paresthesias, recent decreased exercise tolerance, or any other 

anginal-type symptoms. No recent history of abdominal pain, heartburn, nausea, 

diarrhea, melena, gross hematochezia, or any food intolerance, including fatty 

foods, etc.. The patient also denies any recent fever, cough, wheezing, dyspnea

, etc.. No history of recent headaches, visual changes, diplopia, change in 

mental status, or other change in neurological status. His chronic pain 

syndrome is stable at this time.


Onset: Today, Sudden


Onset Date: 03/26/19


Onset Time: 10:00


Duration: Constant


Location: Reports: Generalized (Chronic pain syndrome otherwise no change in 

discomfort)


Quality: Reports: Same as Previous Episode


Severity: Severe


Improves with: Reports: None


Worsens with: Reports: None


Context: Reports: Other (As above).  Denies: Sick Contact, Trauma


Associated Symptoms: Denies: Confusion, Chest Pain, Cough, Diaphoresis, Fever/

Chills, Headaches, Loss of Appetite, Malaise, Nausea/Vomiting, Rash, Seizure, 

Shortness of Breath, Syncope, Weakness


Treatments PTA: Reports: Other Medication(s) (As above)





- Related Data


 Allergies











Allergy/AdvReac Type Severity Reaction Status Date / Time


 


naproxen [From Aleve] Allergy  Other Verified 03/26/19 11:03











Home Meds: 


 Home Meds





Cholecalciferol (Vitamin D3) [Vitamin D3] 2,000 units PO DAILY 01/27/15 [History

]


Multivitamin [Daily Vitamin] 1 tab PO DAILY 01/27/15 [History]


Omeprazole [Prilosec] 1 cap PO DAILY PRN 03/13/15 [History]


ALPRAZolam [Alprazolam] 0.5 mg PO BID PRN 09/16/16 [History]


Albuterol [Proair HFA] 2 puff INH Q4HR PRN 03/23/18 [History]


Amitriptyline [Elavil] 10 mg PO BEDTIME 03/23/18 [History]


Budesonide/Formoterol Fumarate [Symbicort 80-4.5 Mcg Inhaler] 2 inh INH BID 03/ 23/18 [History]


Cyclobenzaprine [Flexeril] 5 mg PO TID PRN 03/23/18 [History]


Escitalopram [Lexapro] 20 mg PO DAILY 03/23/18 [History]


Lisinopril/Hydrochlorothiazide [Lisinopril-Hctz 20-25 mg Tab] 1 each PO DAILY 03 /23/18 [History]


Olopatadine [Patanol 0.1% Ophth Soln] 1 drop EYEBOTH Q8HR PRN 03/23/18 [History]


Ondansetron 4 mg PO Q8H PRN 03/23/18 [History]


Cyanocobalamin (Vitamin B12) [Vitamin B12] 1,000 mcg PO DAILY  tablet 03/25/18 [

Rx]


Albuterol/Ipratropium [DuoNeb 3.0-0.5 MG/3 ML] 3 ml IH Q4H PRN 03/26/19 [History

]


Famotidine [Pepcid] 20 mg PO BID #14 tab 03/26/19 [Rx]


Furosemide [Lasix] 20 mg PO DAILY 03/26/19 [History]


Iron Polysaccharides Complex [Ferrex 150] 150 mg PO BID 03/26/19 [History]


Tiotropium [Spiriva Handihaler] 2 puff INH DAILY 03/26/19 [History]


diphenhydrAMINE [Benadryl] 50 mg PO Q4H PRN #100 cap 03/26/19 [Rx]


oxyCODONE HCl [Oxycodone HCl] 20 mg PO Q4HR PRN 03/26/19 [History]











Past Medical History


HEENT History: Reports: Impaired Vision, Other (See Below).  Denies: Allergic 

Rhinitis, Cataract, Glaucoma, Hard of Hearing, Macular Degeneration, Otitis 

Media, Retinal Detachment


Other HEENT History: He uses OTC reading glasses.


Cardiovascular History: Reports: Hypertension, Syncope, Other (See Below).  

Denies: Afib, Aneurysm, Arrhythmia, Blood Clots/VTE/DVT, CAD, Cardiomyopathy, 

Heart Failure, Heart Murmur, High Cholesterol, MI


Other Cardiovascular History: Patient denies hyperlipidemia despite history of 

LFTs elevation and obesity. Syncopal episode in 2016 secondary to postoperative 

bleeding from pannulectomy. History of d-dimer elevation.


Respiratory History: Reports: Bronchitis, Recurrent, COPD, Intubation, Previous

, Pneumonia, Recurrent, Sleep Apnea, Other (See Below).  Denies: Intubation, 

Difficult, PE, Pneumothorax, TB


Other Respiratory History: He is noncompliant with his CPAP.


Gastrointestinal History: Reports: Cholelithiasis, Diverticulosis, Gastritis, 

GERD, GI Bleed, Inflammatory Bowel Disease, PUD, Other (See Below).  Denies: 

Bowel Obstruction, Celiac Disease, Chronic Constipation, Chronic Diarrhea, 

Cirrhosis, Colon Polyp, Fecal Incontinence, Hepatitis, Irritable Bowel Syndrome

, Jaundice, Pancreatitis


Other Gastrointestinal History: History of upper GI bleed secondary to peptic 

ulcer disease and NSAID abuse in 2006. Crohn's disease by blood work however 

negative follow-up colonoscopies by patient history as below. LFTs elevation 

likely secondary to fatty liver. Recurrent  diverticulitis.


Genitourinary History: Reports: Chronic Renal Insuffiency, Renal Calculus, 

Other (See Below).  Denies: Acute Renal Failure, BPH, Retention, Urinary, STD, 

Urinary Incontinence, UTI, Recurrent


Other Genitourinary History: Right-sided urolithiasis in 2010 with spontaneous 

passage.


Musculoskeletal History: Reports: Arthritis, Back Pain, Chronic, Neck Pain, 

Chronic, Osteoarthritis, Other (See Below).  Denies: Fracture, Gout, RA, SLE


Other Musculoskeletal History: Ankylosis spondylitis. Proximal left fibular 

fracture in 2006 not requiring surgery. Rib contusion on 3/13/15 with suspected 

fracture however negative x-rays. Chronic pain syndrome.


Neurological History: Reports: Neuropathy, Peripheral, Other (See Below).  

Denies: Cerebral Aneurysms, Concussion, CVA, Headaches, Chronic, Head Trauma, 

Migraines, MS, Parkinson's, Seizure, TIA


Other Neuro History: Left Sided Bell's palsy 2002. Benign resting tremor.


Psychiatric History: Reports: Addiction, Anxiety, Depression, Other (See Below)

.  Denies: Abuse, Victim of, ADD, ADHD, Alzheimers Disease, Dementia, Psych 

Hospitalization(s), PTSD, Suicide Attempt, Suicidal Ideation


Other Psychiatric History: Alcohol abuse as below with additional history of 

chronic narcotic use secondary to chronic pain syndrome.


Endocrine/Metabolic History: Reports: Obesity/BMI 30+, Vitamin D Deficiency, 

Other (See Below).  Denies: Diabetes, Type I, Diabetes, Type II, Diabetes 

Mellitus, Type 3c, Hypothyroidism, IDDM, Osteoporosis


Other Endocrine/Metabolic History: Hyponatremia.


Hematologic History: Reports: Anemia, B12 Deficiency, Blood Transfusion(s), 

Iron Deficiency, Other (See Below)


Other Hematologic History: Patient with 7 units of packed red blood cells 

transfused secondary postoperative bleeding 2016.


Immunologic History: Reports: None.  Denies: AIDS, HIV, SLE


Oncologic (Cancer) History: Reports: None.  Denies: Basal Cell Carcinoma, Colon

, Hodgkin's Lymphoma, Leukemia, Lymphoma, Malignant Melanoma, Non-Hodgkin's 

Lymphoma, Prostate, Squamous Cell Carcinoma


Dermatologic History: Reports: Angiodema, Eczema, Other (See Below).  Denies: 

Psoriasis


Other Dermatologic History: Angioedema at age 10 of unknown etiology.





- Infectious Disease History


Infectious Disease History: Reports: Chicken Pox.  Denies: C-Difficile, Measles

, Meningitis, Mononucleosis, MRSA, Mumps, Pertussis (Whooping Cough), Rheumatic 

Fever, Rubella, Scarlet Fever, Shingles, TB, VRE





- Past Surgical History


Head Surgeries/Procedures: Reports: None


HEENT Surgical History: Reports: Oral Surgery, Other (See Below).  Denies: 

Adenoidectomy, Cataract Surgery, Eye Surgery, Laser Surgery, LASIK, Myringotomy 

w Tube(s), Naso-Sinus Surgery, Tonsillectomy


Other HEENT Surgeries/Procedures: [place] teeth extraction 3 2004. Additional 

teeth extractions.


Cardiovascular Surgical History: Reports: Varicose


Other Cardiovascular Surgeries/Procedures: Proximal left leg varicose veins 

removal at time of pannulectomy.


Respiratory Surgical History: Reports: None.  Denies: Thoracentesis


GI Surgical History: Reports: Appendectomy, Bariatric Procedure, Cholecystectomy

, Colonoscopy, Hernia, Inguinal, Other (See Below).  Denies: Hernia Repair/Other

, Polypectomy


Other GI Surgeries/Procedures: Laparoscopic appendectomy on 1/28/15. Mariya-en-Y 

gastric bypass in 2008 with with concomitant cholecystectomy. Pannulectomy 

initially on 8/18/16 with subsequent postoperative bleeding requiring hematoma 

evacuation and blood transfusion on 9/20/16. Bilateral inguinal hernia repair 

including mesh placement on 3/24/15. Last colonoscopy in about 2017. 

Hemorrhoidectomy 2 initially in 2016 and then in 2017.


Male  Surgical History: Reports: Circumcision, Other (See Below).  Denies: 

Renal Calculus, TURP-Transurethral Resection of Prostate, Vasectomy


Other Male  Surgeries/Procedures: Circumcision as an infant.


Endocrine Surgical History: Reports: None.  Denies: Thyroid Biopsy


Neurological Surgical History: Denies: C-Spine, Discectomy, Laminectomy, Lumbar 

Spine, Sacral Spine, Spinal Fusion, Thoracic Spine, Vertebroplasty


Musculoskeletal Surgical History: Reports: Arthroscopic Knee, Arthroscopic 

Procedure, Carpal Tunnel, Shoulder Surgery, Other (See Below).  Denies: 

Ganglion Cyst, Joint Replacement, ORIF


Other Musculoskeletal Surgeries/Procedures:: Right carpal tunnel release in 

about 2016 with left carpal tunnel release in 2017. Right wrist ganglion 

excision 2 in 2016 and 2017. Left open rotator cuff repair in October 2017. 

Right knee upper scopic medial meniscus repair in December 2015.


Dermatological Surgical History: Reports: Plastic Surgical Reconstruction/Repair

, Other (See Below)


Other Dermatological Surgeries/Procedures: Pannulectomy as above.





- Past Imaging History


Past Imaging History: Reports: Cardiac Echo (Negative echocardiogram on 11/15/

16 with ejection fraction of 60%.), CAT Scan (CT of the head on 3/23/18. CT of 

the abdomen and pelvis on 9/20/16 and 9/16/16.), MRI (MRI of the left shoulder 

on 1/5/18. MRI of the right knee on 12/8/15. MRI of the brain on 11/13/13. MRI 

of the right ankle and 6/13/14.), PFT (7/16/18), Stress Testing (Negative 

Cardiolite Lexiscan on 11/15/16 with ejection fraction of 51%.)





Social & Family History





- Tobacco Use


Smoking Status *Q: Never Smoker


Tobacco Use Within Last Twelve Months: No


Used Tobacco, but Quit: No


Smoking Cessation Information Provided To Patient: No


Second Hand Smoke Exposure: Yes


Source of Second Hand Smoke Exposure: Wife smokes


Second Hand Smoke Education Provided: Yes





- Caffeine Use


Caffeine Use: Reports: Soda (Very occasional soda).  Denies: Coffee, Energy 

Drinks, Tea





- Alcohol Use


Alcohol Use History: Yes


Days Per Week of Alcohol Use: 0


Number of Drinks Per Day: 0


Number of Drinks Per Day Comment: Previous history of alcohol abuse from ages 12

48. DWI 2 with no previous alcohol treatment.


Total Drinks Per Week: 0


Alcohol Use in Last Twelve Months: No





- Recreational Drug Use


Recreational Drug Use: Yes


Drug Use in Last 12 Months: No


Recreational Drug Type: Reports: Marijuana/Hashish (Experimental at age 14.).  

Denies: Amphetamines (Speed), Cocaine, Heroin, Inhalants (Glues, Solvents, 

Aerosols), LSD (Acid), Methamphetamine, Morphine, Oxycodone





- Living Situation & Occupation


Living situation: Reports:  (1990. 2 children.), with Family (Wife)


Occupation: Disabled (Secondary to his arthritis since July 2018.)





ED ROS GENERAL





- Review of Systems


Review Of Systems: ROS reveals no pertinent complaints other than HPI.





ED EXAM, GENERAL





- Physical Exam


Exam: See Below


Exam Limited By: No Limitations


General Appearance: Alert, WD/WN, No Apparent Distress


Eye Exam: Bilateral Eye: EOMI, Normal Inspection (No nystagmus), PERRL


Ears: Normal External Exam, Normal Canal, Hearing Grossly Normal, Normal TMs


Nose: Normal Inspection, Normal Mucosa, No Blood


Throat/Mouth: Normal Teeth (Occasional missing teeth), Normal Gums, No Airway 

Compromise, Dysphagia.  No: Normal Lips (Borderline upper lip angioedema), 

Normal Oropharynx (Mild uvular swelling), Normal Voice (Hoarse voice secondary 

to angioedema), Inflammation, Perioral Cyanosis


Head: Atraumatic, Normocephalic.  No: Facial Swelling, Facial Tenderness, Sinus 

Tenderness


Neck: Normal Inspection, Supple, Non-Tender, Full Range of Motion.  No: 

Lymphadenopathy (L), Lymphadenopathy (R), Thyromegaly


Respiratory/Chest: No Respiratory Distress, Lungs Clear, Normal Breath Sounds, 

No Accessory Muscle Use, Chest Non-Tender.  No: Pleural Rub, Retractions


Cardiovascular: Normal Peripheral Pulses, Regular Rate, Rhythm, No Gallop, No 

JVD, No Murmur, No Rub.  No: No Edema (Dependent edema as below), Gallop/S3, 

Gallop/S4, Friction Rub


Peripheral Pulses: 2+: Radial (L), Radial (R)


GI/Abdominal: Normal Bowel Sounds, Soft, Non-Tender, No Organomegaly, No 

Distention, No Abnormal Bruit, No Mass, Other (Obese).  No: Guarding


 (Male) Exam: Deferred


Rectal (Males) Exam: Deferred


Back Exam: Normal Inspection, Full Range of Motion.  No: CVA Tenderness (L), 

CVA Tenderness (R), Muscle Spasm


Extremities: Normal Range of Motion, Non-Tender, Normal Capillary Refill, Pedal 

Edema (Trace to +1 bilateral pedal/pretibial edema).  No: No Pedal Edema, Reshma'

s Sign


Neurological: Alert, Oriented, CN II-XII Intact, Normal Cognition, Normal Gait, 

Normal Reflexes, No Motor/Sensory Deficits


Psychiatric: Normal Affect, Normal Mood


Skin Exam: Warm, Dry, Intact, Normal Color, No Rash, Tattoo(s), Other (Facial 

angioedema as above).  No: Diaphoretic, Ecchymosis, Lymphangitis, Petechiae, 

Rash, Wound/Incision


Lymphatic: No Adenopathy





Course





- Vital Signs


Last Recorded V/S: 


 Last Vital Signs











Temp  36.6 C   03/26/19 11:01


 


Pulse  91   03/26/19 12:26


 


Resp  16   03/26/19 12:26


 


BP  135/85   03/26/19 12:26


 


Pulse Ox  98   03/26/19 12:26











 Vital Signs - 24 hr











  03/26/19 03/26/19 03/26/19





  11:01 11:37 11:45


 


Temperature [ 36.6 C  





Temporal]   


 


Pulse, 94 96 95





Peripheral [   





Pulse Oximetry]   


 


Respiratory 18 18 17





Rate   


 


Blood Pressure 152/99 H 138/87 159/83 H





[Right Upper   





Arm]   


 


O2 Sat by Pulse 100 100 100





Oximetry   














  03/26/19 03/26/19





  12:00 12:26


 


Temperature [  





Temporal]  


 


Pulse, 94 91





Peripheral [  





Pulse Oximetry]  


 


Respiratory 18 16





Rate  


 


Blood Pressure 136/85 135/85





[Right Upper  





Arm]  


 


O2 Sat by Pulse 99 98





Oximetry  














- Orders/Labs/Meds


Orders: 


 Active Orders 24 hr











 Category Date Time Status


 


 Peripheral IV Care [RC] .AS DIRECTED Care  03/26/19 11:05 Active


 


 Sodium Chloride 0.9% [Saline Flush] Med  03/26/19 11:05 Active





 10 ml FLUSH ASDIRECTED PRN   


 


 Obtain Past Medical Record [OM.PC] Routine Oth  03/26/19 11:04 Active


 


 Peripheral IV Insertion Adult [OM.PC] Stat Oth  03/26/19 11:05 Ordered








 Medication Orders





Sodium Chloride (Saline Flush)  10 ml FLUSH ASDIRECTED PRN


   PRN Reason: Keep Vein Open


   Last Admin: 03/26/19 11:32  Dose: 10 ml








Labs: 


None


Meds: 


Medications











Generic Name Dose Route Start Last Admin





  Trade Name Freq  PRN Reason Stop Dose Admin


 


Sodium Chloride  10 ml  03/26/19 11:05  03/26/19 11:32





  Saline Flush  FLUSH   10 ml





  ASDIRECTED PRN   Administration





  Keep Vein Open   





     





     





     














Discontinued Medications














Generic Name Dose Route Start Last Admin





  Trade Name Freq  PRN Reason Stop Dose Admin


 


Diphenhydramine HCl  50 mg  03/26/19 11:05  03/26/19 11:11





  Benadryl  IVPUSH  03/26/19 11:06  50 mg





  ONETIME ONE   Administration





     





     





     





     


 


Epinephrine HCl  0.3 mg  03/26/19 11:06  03/26/19 11:14





  Epipen  IM  03/26/19 11:07  0.3 mg





  ONETIME ONE   Administration





     





     





     





     


 


Epinephrine HCl  Confirm  03/26/19 11:07  03/26/19 12:03





  Epipen  Administered  03/26/19 11:08  Not Given





  Dose   





  0.3 mg   





  .ROUTE   





  .STK-MED ONE   





     





     





     





     


 


Famotidine  40 mg  03/26/19 11:04  03/26/19 11:25





  Pepcid  IVPUSH  03/26/19 11:05  40 mg





  ONETIME ONE   Administration





     





     





     





     


 


Methylprednisolone Sodium Succinate  125 mg  03/26/19 11:04  03/26/19 12:03





  Solu-Medrol  IVPUSH  03/26/19 11:05  125 mg





  ONETIME ONE   Administration





     





     





     





     














- Radiology Interpretation


Free Text/Narrative:: 





None





Departure





- Departure


Time of Disposition: 13:00


Disposition: Home, Self-Care 01


Condition: Good


Clinical Impression: 


 Angioedema, Hypertension, Peptic ulcer disease, Sleep apnea, Osteoarthritis, 

COPD (chronic obstructive pulmonary disease), Tobacco abuse counseling, Renal 

insufficiency








- Discharge Information


*PRESCRIPTION DRUG MONITORING PROGRAM REVIEWED*: Not Applicable


*COPY OF PRESCRIPTION DRUG MONITORING REPORT IN PATIENT ROSALINO: Not Applicable


Prescriptions: 


diphenhydrAMINE [Benadryl] 50 mg PO Q4H PRN #100 cap


 PRN Reason: Allergies


Famotidine [Pepcid] 20 mg PO BID #14 tab


Instructions:  Angioedema, Easy-to-Read


Referrals: 


Theodora La, NP [Primary Care Provider] - 


Forms:  ED Department Discharge


Additional Instructions: 


1.  Follow up with your regular provider in 10-14 days as scheduled on 3/28 

with recommended CBC, comprehensive metabolic panel, uric acid level, and 

magnesium level with additional blood work per their discretion needed. Bring 

these discharge instructions with you to that visit..


2.  Sedation precautions with no driving, etc. for 18 hours because of 

emergency room medications. 


3.  Stop all tobacco exposure ASAP as directed with counselling, information, 

etc. given


4.  Initiate your Pepcid tomorrow morning as discussed for at least one week 

with additional OTC Benadryl as needed in 4 hours as discussed with sedation 

precautions with Benadryl.


5.  Immediately after this visit verify that your cellular telephone's 

voicemail has been activated and is empty. Also verify that your  home telephone

's answering machine is operating properly and has space to receive messages. 

Note that it is sometimes necessary for us to be able to contact you at a later 

date to discuss your medical care.


6.   Please remember that we are ALWAYS here for you and want to answer any 

questions you may have. Feel free to call the hospital any time and we call you 

back ASAP. 


7.  Discuss with your provider various options, including different types of 

masks, etc. for treatment of your CPAP.


8.  Discuss with your regular provider at the above follow-up discontinuation 

of your lisinopril HCTZ secondary to today's reaction





- Problem List & Annotations


(1) Angioedema


SNOMED Code(s): 50225320


   Code(s): T78.3XXA - ANGIONEUROTIC EDEMA, INITIAL ENCOUNTER   Status: Acute   

Priority: High   Onset Date: 03/26/19   Annotation/Comment:: Aggressive therapy 

in the emergency room as above with excellent response with patient mostly 

asymptomatic prior to discharge. Various therapeutic options were discussed 

with the patient and his wife, who did not want to stop his lisinopril HCTZ at 

this time in spite of possible etiology to today's injury edema. Last episode 

of angioedema at age 10, however. They do agree to discuss this further with 

their regular provider at time of follow-up later this week as per discharge 

instructions. Sedation, etc. precautions given. No other etiology of today's 

allergic reaction. He did not wish to have an EpiPen secondary to cost, etc.   


Qualifiers: 


   Encounter type: initial encounter   Qualified Code(s): T78.3XXA - 

Angioneurotic edema, initial encounter   





(2) COPD (chronic obstructive pulmonary disease)


SNOMED Code(s): 58617833


   Code(s): J44.9 - CHRONIC OBSTRUCTIVE PULMONARY DISEASE, UNSPECIFIED   Status

: Chronic   Priority: Medium   Annotation/Comment:: Stable by history with no 

recent history of fever, bronchitic type symptoms, etc.   


Qualifiers: 


   COPD type: emphysema   Emphysema type: panlobular   Qualified Code(s): J43.1 

- Panlobular emphysema   





(3) Hypertension


SNOMED Code(s): 28240213


   Code(s): I10 - ESSENTIAL (PRIMARY) HYPERTENSION   Status: Chronic   Priority

: Medium   Annotation/Comment:: Somewhat elevated in the emergency room 

initially, however stable prior to discharge without any additional 

medications. Continue to observe closely by regular provider.   


Qualifiers: 


   Hypertension type: essential hypertension   Qualified Code(s): I10 - 

Essential (primary) hypertension   





(4) Osteoarthritis


SNOMED Code(s): 886893870


   Code(s): M19.90 - UNSPECIFIED OSTEOARTHRITIS, UNSPECIFIED SITE   Status: 

Chronic   Priority: Medium   Annotation/Comment:: Stable chronic pain syndrome 

with chronic narcotic use as above.   


Qualifiers: 


   Osteoarthritis location: multiple joints   Osteoarthritis type: primary   

Qualified Code(s): M15.0 - Primary generalized (osteo)arthritis   





(5) Peptic ulcer disease


SNOMED Code(s): 62948466


   Code(s): K27.9 - PEPTIC ULC, SITE UNSP, UNSP AS AC OR CHR, W/O HEMOR OR PERF

   Status: Chronic   Priority: Medium   Annotation/Comment:: Stable by history 

with history of distant GI bleed secondary to NSAID abuse.   





(6) Renal insufficiency


SNOMED Code(s): 224205501, 100506330


   Code(s): N28.9 - DISORDER OF KIDNEY AND URETER, UNSPECIFIED   Status: 

Chronic   Priority: Medium   Annotation/Comment:: Note patient is currently 

taking his Lasix on a daily rather than when necessary basis with no potassium 

supplement. Note current lisinopril HCTZ therapy. Follow-up blood work 

recommended as per discharge instructions.   





(7) Sleep apnea


SNOMED Code(s): 18875763


   Code(s): G47.30 - SLEEP APNEA, UNSPECIFIED   Status: Chronic   Priority: 

Medium   Annotation/Comment:: He has been noncompliant with his CPAP. He will 

discuss this further with his regular provider at follow-up with his also 

discussed during today's visit. Note obesity with weight loss in moderation 

advisable.   


Qualifiers: 


   Sleep apnea type: unspecified type   Qualified Code(s): G47.30 - Sleep apnea

, unspecified   





(8) Tobacco abuse counseling


SNOMED Code(s): 656110758, 069529906, 663191746


   Code(s): Z71.6 - TOBACCO ABUSE COUNSELING   Status: Chronic   Priority: 

Medium   Annotation/Comment:: The patient's wife was strongly encouraged to 

discontinue tobacco use with information provided at discharge.   





- Problem List Review


Problem List Initiated/Reviewed/Updated: Yes





- My Orders


Last 24 Hours: 


My Active Orders





03/26/19 11:04


Obtain Past Medical Record [OM.PC] Routine 





03/26/19 11:05


Peripheral IV Care [RC] .AS DIRECTED 


Sodium Chloride 0.9% [Saline Flush]   10 ml FLUSH ASDIRECTED PRN 


Peripheral IV Insertion Adult [OM.PC] Stat 














- Assessment/Plan


Last 24 Hours: 


My Active Orders





03/26/19 11:04


Obtain Past Medical Record [OM.PC] Routine 





03/26/19 11:05


Peripheral IV Care [RC] .AS DIRECTED 


Sodium Chloride 0.9% [Saline Flush]   10 ml FLUSH ASDIRECTED PRN 


Peripheral IV Insertion Adult [OM.PC] Stat 











Assessment:: 





As above


Plan: 





As above. Extensive precautions were given to the patient and his wife, who are 

in agreement with the treatment plan. See Patient Instructions for further 

treatment and plan.

## 2019-03-26 NOTE — EDM.PDOC
ED HPI GENERAL MEDICAL PROBLEM





- General


Chief Complaint: General


Stated Complaint: HA, fatigue, chronic pain


Time Seen by Provider: 19 20:10


Source of Information: Reports: Patient, Old Records ([facility] chart/EMR), Other ([facility]MR earlier this morning)


History Limitations: Reports: No Limitations





- History of Present Illness


INITIAL COMMENTS - FREE TEXT/NARRATIVE: 





The patient was brought to the emergency room via private automobile by his 

wife for evaluation of 7/10 bilateral pressure type headache with additional 

exacerbation of his chronic arthralgias, which he currently rates at 9/10. He 

did take 400 mg of ibuprofen shortly after discharge from this facility earlier 

this afternoon at 13:00 hours. He also did take some additional Flexeril later 

this afternoon with no improvement of his symptoms. He has run out of his 

narcotic medications and has not taken any today. Note that the patient was 

aggressively treated in the emergency room earlier this morning for borderline 

mild angioedema of unknown etiology with Epi-pen, high-dose IV Pepcid, IV 

Benadryl, and IV Solu-Medrol given with overall excellent results and no return 

of symptoms since hospital discharge. The patient denies any chest pain/pressure

, heart flutter, dizziness, orthostasis, orthopnea, diaphoresis, paresthesias, 

recent decreased exercise tolerance, or any other anginal-type symptoms. No 

recent history of abdominal pain, heartburn, nausea, diarrhea, melena, gross 

hematochezia, or any food intolerance, including fatty foods, etc.. The patient 

also denies any recent fever, cough, wheezing, dyspnea, etc.. No history of 

recent visual changes, diplopia, or other change in neurological status with 

possible confusion for about one hour at 17:30 hours this afternoon.


Onset: Today, Gradual


Onset Date: 19


Onset Time: 13:00


Duration: Constant


Location: Reports: Head, Lower Extremity, Left (Leg cramps bilaterally), Lower 

Extremity, Right, Generalized (Arthralgias as above).  Denies: Face, Neck, Chest

, Abdomen, Back


Quality: Reports: Ache, Pressure (As above), Same as Previous Episode


Severity: Severe


Improves with: Reports: None


Worsens with: Reports: None


Context: Reports: Other (As above).  Denies: Lifting, Trauma


Associated Symptoms: Reports: Confusion (As above).  Denies: Chest Pain, Cough, 

Diaphoresis, Fever/Chills, Headaches, Loss of Appetite, Malaise, Seizure, 

Shortness of Breath, Syncope, Weakness


Treatments PTA: Reports: NSAIDS.  Denies: Acetaminophen, Aspirin


  ** Headache


Pain Score (Numeric/FACES): 9





  ** Leg


Pain Score (Numeric/FACES): 7 (Generalized arthralgias and leg cramps)





- Related Data


 Allergies











Allergy/AdvReac Type Severity Reaction Status Date / Time


 


naproxen [From Aleve] Allergy  Other Verified 19 20:13











Home Meds: 


 Home Meds





Cholecalciferol (Vitamin D3) [Vitamin D3] 2,000 units PO DAILY 01/27/15 [History

]


Multivitamin [Daily Vitamin] 1 tab PO DAILY 01/27/15 [History]


Omeprazole [Prilosec] 1 cap PO DAILY PRN 03/13/15 [History]


ALPRAZolam [Alprazolam] 0.5 mg PO BID PRN 16 [History]


Albuterol [Proair HFA] 2 puff INH Q4HR PRN 18 [History]


Amitriptyline [Elavil] 10 mg PO BEDTIME 18 [History]


Budesonide/Formoterol Fumarate [Symbicort 80-4.5 Mcg Inhaler] 2 inh INH BID  [History]


Cyclobenzaprine [Flexeril] 5 mg PO TID PRN 18 [History]


Escitalopram [Lexapro] 20 mg PO DAILY 18 [History]


Lisinopril/Hydrochlorothiazide [Lisinopril-Hctz 20-25 mg Tab] 1 each PO DAILY  [History]


Olopatadine [Patanol 0.1% Ophth Soln] 1 drop EYEBOTH Q8HR PRN 18 [History]


Ondansetron 4 mg PO Q8H PRN 18 [History]


Cyanocobalamin (Vitamin B12) [Vitamin B12] 1,000 mcg PO DAILY  tablet 18 [

Rx]


Albuterol/Ipratropium [DuoNeb 3.0-0.5 MG/3 ML] 3 ml IH Q4H PRN 19 [History

]


Famotidine [Pepcid] 20 mg PO BID #14 tab 19 [Rx]


Furosemide 20 mg PO BID #1 tablet 19 [Rx]


Ibuprofen 400 mg PO Q6H PRN 19 [History]


Iron Polysaccharides Complex [Ferrex 150] 150 mg PO BID 19 [History]


Magnesium Oxide 400 mg PO BID #20 tab 19 [Rx]


Tiotropium [Spiriva Handihaler] 2 puff INH DAILY 19 [History]


diphenhydrAMINE [Benadryl] 50 mg PO Q4H PRN #100 cap 19 [Rx]


oxyCODONE HCl [Oxycodone HCl] 20 mg PO Q4HR PRN 19 [History]











Past Medical History


HEENT History: Reports: Impaired Vision, Other (See Below).  Denies: Allergic 

Rhinitis, Cataract, Glaucoma, Hard of Hearing, Macular Degeneration, Otitis 

Media, Retinal Detachment


Other HEENT History: He uses OTC reading glasses. Dry eye syndrome in the left 

eye secondary to previous Bell's palsy?


Cardiovascular History: Reports: Hypertension, Syncope, Other (See Below).  

Denies: Afib, Aneurysm, Arrhythmia, Blood Clots/VTE/DVT, CAD, Cardiomyopathy, 

Heart Failure, Heart Murmur, High Cholesterol, MI


Other Cardiovascular History: Patient denies hyperlipidemia despite history of 

LFTs elevation and obesity. Syncopal episode in 2016 secondary to postoperative 

bleeding from pannulectomy. History of d-dimer elevation.


Respiratory History: Reports: Bronchitis, Recurrent, COPD, Intubation, Previous

, Pneumonia, Recurrent, Sleep Apnea, Other (See Below).  Denies: Intubation, 

Difficult, PE, Pneumothorax, TB


Other Respiratory History: He is noncompliant with his CPAP.


Gastrointestinal History: Reports: Cholelithiasis, Diverticulosis, Gastritis, 

GERD, GI Bleed, Inflammatory Bowel Disease, PUD, Other (See Below).  Denies: 

Bowel Obstruction, Celiac Disease, Chronic Constipation, Chronic Diarrhea, 

Cirrhosis, Colon Polyp, Fecal Incontinence, Hepatitis, Irritable Bowel Syndrome

, Jaundice, Pancreatitis


Other Gastrointestinal History: History of upper GI bleed secondary to peptic 

ulcer disease and NSAID abuse in . Crohn's disease by blood work however 

negative follow-up colonoscopies by patient history as below. LFTs elevation 

likely secondary to fatty liver. Recurrent  diverticulitis.


Genitourinary History: Reports: Chronic Renal Insuffiency, Renal Calculus, 

Other (See Below).  Denies: Acute Renal Failure, BPH, Retention, Urinary, STD, 

Urinary Incontinence, UTI, Recurrent


Other Genitourinary History: Right-sided urolithiasis in  with spontaneous 

passage.


Musculoskeletal History: Reports: Arthritis, Back Pain, Chronic, Neck Pain, 

Chronic, Osteoarthritis, Other (See Below).  Denies: Fracture, Gout, RA, SLE


Other Musculoskeletal History: Ankylosis spondylitis. Proximal left fibular 

fracture in  not requiring surgery. Rib contusion on 3/13/15 with suspected 

fracture however negative x-rays. Chronic pain syndrome.


Neurological History: Reports: Headaches, Chronic, Migraines, Neuropathy, 

Peripheral, Other (See Below).  Denies: Cerebral Aneurysms, Concussion, CVA, 

Head Trauma, MS, Parkinson's, Seizure, TIA


Other Neuro History: Left Sided Bell's palsy . Benign resting tremor.


Psychiatric History: Reports: Addiction, Anxiety, Depression, Hallucinations, 

Other (See Below).  Denies: Abuse, Victim of, ADD, ADHD, Alzheimers Disease, 

Dementia, Psych Hospitalization(s), PTSD, Suicide Attempt, Suicidal Ideation


Other Psychiatric History: Alcohol abuse as below with additional history of 

chronic narcotic use secondary to chronic pain syndrome. History of 

intermittent hallucinations and confusion secondary to previous narcotic 

medications, etc.


Endocrine/Metabolic History: Reports: Obesity/BMI 30+, Vitamin D Deficiency, 

Other (See Below).  Denies: Diabetes, Type I, Diabetes, Type II, Diabetes 

Mellitus, Type 3c, Hypothyroidism, IDDM, Osteoporosis


Other Endocrine/Metabolic History: Hyponatremia. Hypoglycemia.


Hematologic History: Reports: Anemia, B12 Deficiency, Blood Transfusion(s), 

Iron Deficiency, Other (See Below)


Other Hematologic History: Patient with 7 units of packed red blood cells 

transfused secondary postoperative bleeding 2016.


Immunologic History: Reports: None.  Denies: AIDS, HIV, SLE


Oncologic (Cancer) History: Reports: None.  Denies: Basal Cell Carcinoma, Colon

, Hodgkin's Lymphoma, Leukemia, Lymphoma, Malignant Melanoma, Non-Hodgkin's 

Lymphoma, Prostate, Squamous Cell Carcinoma


Dermatologic History: Reports: Angiodema, Eczema, Other (See Below).  Denies: 

Psoriasis


Other Dermatologic History: Angioedema at age 10 with additional episode on 3/26

/19 of unknown etiology.





- Infectious Disease History


Infectious Disease History: Reports: Chicken Pox.  Denies: C-Difficile, Measles

, Meningitis, Mononucleosis, MRSA, Mumps, Pertussis (Whooping Cough), Rheumatic 

Fever, Rubella, Scarlet Fever, Shingles, TB, VRE





- Past Surgical History


Head Surgeries/Procedures: Reports: None


HEENT Surgical History: Reports: Oral Surgery, Other (See Below).  Denies: 

Adenoidectomy, Cataract Surgery, Eye Surgery, Laser Surgery, LASIK, Myringotomy 

w Tube(s), Naso-Sinus Surgery, Tonsillectomy


Other HEENT Surgeries/Procedures: [place] teeth extraction 3 . Additional 

teeth extractions.


Cardiovascular Surgical History: Reports: Varicose


Other Cardiovascular Surgeries/Procedures: Proximal left leg varicose veins 

removal at time of pannulectomy.


Respiratory Surgical History: Reports: None.  Denies: Thoracentesis


GI Surgical History: Reports: Appendectomy, Bariatric Procedure, Cholecystectomy

, Colonoscopy, Hernia, Inguinal, Other (See Below).  Denies: Hernia Repair/Other

, Polypectomy


Other GI Surgeries/Procedures: Laparoscopic appendectomy on 1/28/15. Mariya-en-Y 

gastric bypass in  with with concomitant cholecystectomy. Pannulectomy 

initially on 16 with subsequent postoperative bleeding requiring hematoma 

evacuation and blood transfusion on 16. Bilateral inguinal hernia repair 

including mesh placement on 3/24/15. Last colonoscopy in about . 

Hemorrhoidectomy 2 initially in  and then in 2017.


Male  Surgical History: Reports: Circumcision, Other (See Below).  Denies: 

Renal Calculus, TURP-Transurethral Resection of Prostate, Vasectomy


Other Male  Surgeries/Procedures: Circumcision as an infant.


Endocrine Surgical History: Reports: None.  Denies: Thyroid Biopsy


Neurological Surgical History: Reports: None.  Denies: C-Spine, Discectomy, 

Laminectomy, Lumbar Spine, Sacral Spine, Spinal Fusion, Thoracic Spine, 

Vertebroplasty


Musculoskeletal Surgical History: Reports: Arthroscopic Knee, Arthroscopic 

Procedure, Carpal Tunnel, Shoulder Surgery, Other (See Below).  Denies: 

Ganglion Cyst, Joint Replacement, ORIF


Other Musculoskeletal Surgeries/Procedures:: Right carpal tunnel release in 

about  with left carpal tunnel release in 2017. Right wrist ganglion 

excision 2 in  and . Left shoulder open rotator cuff repair in 2017. Right knee laparoscopic medial meniscus repair in 2015.


Dermatological Surgical History: Reports: Plastic Surgical Reconstruction/Repair

, Other (See Below)


Other Dermatological Surgeries/Procedures: Pannulectomy as above.





- Past Imaging History


Past Imaging History: Reports: Cardiac Echo (Negative echocardiogram on 11/15/

16 with ejection fraction of 60%.), CAT Scan (CT of the head on 3/23/18. CT of 

the abdomen and pelvis on 16 and 16.), MRI (MRI of the left shoulder 

on 18. MRI of the right knee on 12/8/15. MRI of the brain on 13. MRI 

of the right ankle and 14.), PFT (18), Stress Testing (Negative 

Cardiolite Lexiscan on 11/15/16 with ejection fraction of 51%.)





Social & Family History





- Family History


HEENT: Reports: None.  Denies: Glaucoma, Macular Degeneration, Retinal 

Detachment


Cardiac: Reports: Blood Clots/VTE/DVT, CAD, Heart Failure, High Cholesterol, 

Hypertension, MI, Stent, Other (See Below).  Denies: Afib, AICD, Aneurysm, 

Arrhythmia, Pacemaker, Syncope


Other Cardiac Family History: Maternal grandmother with fatal MI in her 70s. 

Father with cardiomyopathy and CHF. Paternal uncle with cardiomyopathy with 

fatal CHF at age 71. Paternal uncle with MI at age 50 with history of PTCA/

stents. Hypertension in father, paternal grandfather, maternal grandfather, 

maternal grandmother, maternal uncle and mother. Hyperlipidemia  in all family 

members as above. Paternal grandmother with DVT.


Respiratory: Reports: Asthma, COPD, Pneumothorax, Sleep Apnea, Other (See Below)

.  Denies: PE


Other Respiratory Family Hisory: Paternal uncle COPD, sleep apnea, and 

pneumothorax with history of tobacco use. Another paternal uncle with COPD and 

possible asthma today with history of tobacco use. Father with COPD with 

history of tobacco use.


GI: Reports: None.  Denies: Celiac Disease, Colon Polyps, GERD, GI bleed, 

Inflammatory Bowel Disease, Irritable Bowel Syndrome, PUD


: Reports: None.  Denies: Renal Calculus, Renal Disease/Insufficiency


OBGYN: Reports: Recurrent Spontaneous .  Denies: Endometriosis


Other OBGYN Family History: Maternal Grandmother with recurrent SAB.


Musculoskeletal: Reports: Arthritis, Osteoarthritis, RA, Other (See Below).  

Denies: Gout, SLE


Other Musculoskeletal Family History: Father and maternal grandfather with 

rheumatoid arthritis. Maternal grandparents with osteoarthritis.


Neurological: Reports: Other (See Below).  Denies: Alzheimers Disease, Cerebral 

Aneurysms, CVA, Dementia, Migraines, MS, Parkinson's, Seizure, TIA


Other Neurological Family History: Paternal grandmother with history of OBS.


Psychiatric: Reports: Anxiety, Depression, Psychosis, Schizophrenia, Other (See 

Below).  Denies: Abuse, Victim of, ADD, ADHD, Psych Hospitalization(s), PTSD, 

Suicide Attempt


Other Psychiatric Family History: Maternal grandmother anxiety depression 

disorder. Maternal aunt 2 with history of psychosis. Brother with 

schizophrenia. Paternal uncle with history of alcohol abuse.


Endocrine/Metabolic: Reports: Diabetes, type II, IDDM, Other (See Below).  

Denies: Diabetes, Gestational, Diabetes, Type I, Diabetes Mellitus, Type 3c, 

Hypothyroidism


Other Endocrine/Metabolic Family History: Father with AODM. Paternal uncle and 

2 with IDDM. Obesity in father, maternal grandparents, brothers 2 paternal 

uncles 2, maternal uncle 1, and  maternal aunts 4


Hematologic: Reports: None.  Denies: Anemia, SLE


Immunologic: Reports: None.  Denies: AIDS, HIV, SLE


Dermatologic: Reports: None.  Denies: Angiodema, Eczema, Psoriasis


Oncologic: Reports: Brain, Lung, Lymphoma, Pancreatic, Prostate, Other (See 

Below).  Denies: Colon, Hodgkin's Lymphoma, Leukemia, Non-Hodgkin's Lymphoma


Other Oncologic Family History: Mother with fatal lung cancer at age 63 with 

history of tobacco use. Father with history of fatal brain tumor at age 60 with 

history of tobacco use. Paternal uncle with history of prostate cancer and 

fatal pancreatic cancer at age 61 with history of alcohol abuse. Maternal uncle 

with fatal lung cancer at age 49 with history of tobacco use. Maternal aunt 

with fatal lymphoma at age 72.





- Tobacco Use


Smoking Status *Q: Never Smoker


Tobacco Use Within Last Twelve Months: No


Used Tobacco, but Quit: No


Smoking Cessation Information Provided To Patient: No


Second Hand Smoke Exposure: Yes


Source of Second Hand Smoke Exposure: Wife smokes


Second Hand Smoke Education Provided: Yes





- Caffeine Use


Caffeine Use: Reports: Soda (Very occasional soda).  Denies: Coffee, Energy 

Drinks, Tea





- Alcohol Use


Alcohol Use History: Yes


Days Per Week of Alcohol Use: 0


Number of Drinks Per Day: 0


Number of Drinks Per Day Comment: Previous history of alcohol abuse between 

ages [age]. DWI 2 with no previous alcohol treatment.


Total Drinks Per Week: 0


Alcohol Use in Last Twelve Months: No





- Recreational Drug Use


Recreational Drug Use: Yes


Drug Use in Last 12 Months: No


Recreational Drug Type: Reports: Marijuana/Hashish (Experimental at age 14.).  

Denies: Amphetamines (Speed), Cocaine, Heroin, Inhalants (Glues, Solvents, 

Aerosols), LSD (Acid), Methamphetamine, Morphine, Oxycodone





- Living Situation & Occupation


Living situation: Reports:  (. 2 children.), with Family (Wife)


Occupation: Disabled (Secondary to his arthritis since 2018.)





ED ROS GENERAL





- Review of Systems


Review Of Systems: ROS reveals no pertinent complaints other than HPI.





ED EXAM, GENERAL





- Physical Exam


Exam: See Below


Exam Limited By: No Limitations


General Appearance: Alert, WD/WN, No Apparent Distress, Anxious (Moderate)


Eye Exam: Bilateral Eye: EOMI, Normal Fundi, Normal Inspection (No nystagmus), 

PERRL


Ears: Normal External Exam, Normal Canal, Hearing Grossly Normal, Normal TMs


Nose: Normal Inspection, Normal Mucosa, No Blood


Throat/Mouth: Normal Inspection, Normal Lips, Normal Teeth (Occasional missing 

teeth), Normal Gums, Normal Oropharynx, Normal Voice, No Airway Compromise.  No

: Dysphagia, Perioral Cyanosis


Head: Atraumatic, Normocephalic.  No: Facial Swelling, Facial Tenderness, Sinus 

Tenderness


Neck: Normal Inspection, Supple, Non-Tender, Full Range of Motion.  No: 

Lymphadenopathy (L), Lymphadenopathy (R), Thyromegaly


Respiratory/Chest: No Respiratory Distress, No Accessory Muscle Use, Chest Non-

Tender, Rales (Bilateral basilar mild).  No: Pleural Rub, Retractions


Cardiovascular: Normal Peripheral Pulses, Regular Rate, Rhythm, No Gallop, No 

JVD, No Murmur, No Rub.  No: No Edema (Dependent edema as below), Gallop/S3, 

Gallop/S4, Friction Rub


Peripheral Pulses: 2+: Radial (L), Radial (R), Dorsalis Pedis (L), Dorsalis 

Pedis (R)


GI/Abdominal: Normal Bowel Sounds, Soft, Non-Tender, No Organomegaly, No 

Distention, No Abnormal Bruit, No Mass, Other (Obese).  No: Guarding


 (Male) Exam: Deferred


Rectal (Males) Exam: Deferred


Back Exam: Normal Inspection, Full Range of Motion.  No: CVA Tenderness (L), 

CVA Tenderness (R), Muscle Spasm


Extremities: Normal Range of Motion, Non-Tender, Normal Capillary Refill, Pedal 

Edema (Trace bilateral pedal/pretibial edema).  No: No Pedal Edema, Reshma's Sign


Neurological: Alert, Oriented, CN II-XII Intact, Normal Cognition, Normal Gait, 

Normal Reflexes (Negative Babinski's), No Motor/Sensory Deficits


Psychiatric: Anxious (Moderate), Depressed Mood (Mild to Moderate with adequate 

eye contact).  No: Flat Affect, Tearful


Skin Exam: Warm, Dry, Intact, Normal Color, No Rash, Tattoo(s).  No: Diaphoretic

, Ecchymosis, Jaundice, Lymphangitis, Pallor, Wound/Incision


Lymphatic: No Adenopathy





Course





- Vital Signs


Last Recorded V/S: 


 Last Vital Signs











Temp  36.8 C   19 20:02


 


Pulse  95   19 21:45


 


Resp  17   19 21:45


 


BP  117/90   19 21:45


 


Pulse Ox  99   19 21:45











 Vital Signs - 24 hr











  19





  20:02 20:15 20:30


 


Temperature [ 36.8 C  





Temporal]   


 


Pulse, 96 92 92





Peripheral [   





Pulse Oximetry]   


 


Respiratory 19 18 18





Rate   


 


Blood Pressure 147/98 H 128/81 141/81 H





[Left Upper Arm   





]   


 


O2 Sat by Pulse 99 98 98





Oximetry   














  19





  20:45 21:00 21:30


 


Temperature [   





Temporal]   


 


Pulse,  98 98





Peripheral [   





Pulse Oximetry]   


 


Respiratory 18 17 18





Rate   


 


Blood Pressure 138/84 144/97 H 104/83





[Left Upper Arm   





]   


 


O2 Sat by Pulse 100 100 99





Oximetry   














  19





  21:45


 


Temperature [ 





Temporal] 


 


Pulse, 95





Peripheral [ 





Pulse Oximetry] 


 


Respiratory 17





Rate 


 


Blood Pressure 117/90





[Left Upper Arm 





] 


 


O2 Sat by Pulse 99





Oximetry 














- Orders/Labs/Meds


Orders: 


 Active Orders 24 hr











 Category Date Time Status


 


 Peripheral IV Care [RC] .AS DIRECTED Care  19 20:18 Active


 


 Chest 2V [CR] Urgent Exams  19 21:08 Taken


 


 CULTURE URINE [RM] Routine Lab  19 20:46 Received


 


 Sodium Chloride 0.9% [Saline Flush] Med  19 20:18 Active





 10 ml FLUSH ASDIRECTED PRN   


 


 Obtain Past Medical Record [OM.PC] Routine Oth  19 20:16 Active


 


 Peripheral IV Insertion Adult [OM.PC] Routine Oth  19 20:18 Ordered








 Medication Orders





Sodium Chloride (Saline Flush)  10 ml FLUSH ASDIRECTED PRN


   PRN Reason: Keep Vein Open


   Last Admin: 19 21:18  Dose: 10 ml


   Admin: 19 20:41  Dose: 10 ml








Labs: 


 Laboratory Tests











  19 Range/Units





  20:30 20:30 20:46 


 


WBC  11.6 H    (4.0-10.2)  K/uL


 


RBC  4.28 L    (4.33-5.41)  M/uL


 


Hgb  12.3 L D    (13.1-16.8)  g/dL


 


Hct  37.0 L    (39.0-49.0)  %


 


MCV  86.4  D    (84.0-98.0)  fL


 


MCH  28.7    (28.2-33.3)  pg


 


MCHC  33.2    (31.7-36.0)  g/dL


 


RDW  15.4 H    (11.2-14.1)  %


 


Plt Count  368 H D    (150-350)  K/uL


 


Neut % (Auto)  95.3 H    (45.0-80.0)  %


 


Lymph % (Auto)  3.0 L    (10.0-50.0)  %


 


Mono % (Auto)  1.6 L    (2.0-14.0)  %


 


Eos % (Auto)  0.0    (0.0-5.0)  %


 


Baso % (Auto)  0.1    (0.0-2.0)  %


 


Neut # (Auto)  11.01 H    (1.40-7.00)  K/uL


 


Lymph # (Auto)  0.35 L    (0.50-3.50)  K/uL


 


Mono # (Auto)  0.18    (0.00-1.00)  K/uL


 


Eos # (Auto)  0.00    (0.00-0.50)  K/uL


 


Baso # (Auto)  0.01    (0.00-0.20)  K/uL


 


Sodium   129 L   (136-145)  mmol/L


 


Potassium   4.2   (3.5-5.1)  mmol/L


 


Chloride   95 L   ()  mmol/L


 


Carbon Dioxide   22.8   (21.0-32.0)  mmol/L


 


BUN   13   (7-18)  mg/dL


 


Creatinine   0.90   (0.51-1.17)  mg/dL


 


Est Cr Clr Drug Dosing   108.98   mL/min


 


Estimated GFR (MDRD)   > 60   mL/min


 


Glucose   153 H   ()  mg/dL


 


Uric Acid   4.8   (2.6-7.2)  mg/dL


 


Calcium   9.0   (8.5-10.1)  mg/dL


 


Magnesium   1.5 L   (1.8-2.4)  mg/dL


 


Total Bilirubin   0.4   (0.2-1.0)  mg/dL


 


AST   18   (15-37)  U/L


 


ALT   19   (12-78)  U/L


 


Alkaline Phosphatase   92   ()  IU/L


 


Creatine Kinase   32   ()  U/L


 


Troponin I   0.000   (0.000-0.056)  ng/mL


 


C-Reactive Protein   1.7 H   (<=0.9)  mg/dL


 


NT-Pro-B Natriuret Pep   305 H   (0-125)  pg/mL


 


Total Protein   8.7 H   (6.4-8.2)  g/dL


 


Albumin   3.5   (3.4-5.0)  g/dL


 


TSH, Ultra Sensitive   2.029   (0.358-3.740)  mIU/mL


 


Specimen Type    Urincc  


 


Urine Color    Yellow  


 


Urine Appearance    Clear  


 


Urine pH    5.5  (5.0-9.0)  


 


Ur Specific Gravity    1.010  (1.005-1.030)  


 


Urine Protein    Negative  (NEGATIVE)  mg/dL


 


Urine Glucose (UA)    Negative  (NEGATIVE)  mg/dL


 


Urine Ketones    Negative  (NEGATIVE)  mg/dL


 


Urine Occult Blood    Negative  (NEGATIVE)  


 


Urine Nitrite    Negative  (NEGATIVE)  


 


Urine Bilirubin    Negative  (NEGATIVE)  


 


Urine Urobilinogen    0.2  (0.2-1.0)  E.U./dL


 


Ur Leukocyte Esterase    Negative  (NEGATIVE)  


 


Urine RBC    0-5  /HPF


 


Urine WBC    Not seen  /HPF


 


Ur Epithelial Cells    Few  /LPF


 


Urine Bacteria    Not seen  (NONE TO FEW)  /HPF











Meds: 


Medications











Generic Name Dose Route Start Last Admin





  Trade Name [place]  PRN Reason Stop Dose Admin


 


Sodium Chloride  10 ml  19 20:18  19 21:18





  Saline Flush  FLUSH   10 ml





  ASDIRECTED PRN   Administration





  Keep Vein Open   





     





     





     














Discontinued Medications














Generic Name Dose Route Start Last Admin





  Trade Name Naseemq  PRN Reason Stop Dose Admin


 


Diphenhydramine HCl  50 mg  19 20:17  19 20:41





  Benadryl  IVPUSH  19 20:18  50 mg





  ONETIME ONE   Administration





     





     





     





     


 


Hydromorphone HCl  1 mg  19 21:09  19 21:17





  Dilaudid  IVPUSH  19 21:10  1 mg





  ONETIME ONE   Administration





     





     





     





     


 


Magnesium Sulfate 4 gm/ Premix  100 mls @ 200 mls/hr  19 21:14  19 

21:26





  IV  19 21:43  200 mls/hr





  ONETIME ONE   Administration





     





     





     





     


 


Ketorolac Tromethamine  30 mg  19 20:16  19 20:41





  Toradol  IVPUSH  19 20:17  30 mg





  ONETIME ONE   Administration





     





     





     





     


 


Lorazepam  1 mg  19 20:17  19 20:41





  Ativan  IVPUSH  19 20:18  1 mg





  ONETIME ONE   Administration





     





     





     





     


 


Lorazepam  1 mg  19 21:09  19 21:17





  Ativan  IVPUSH  19 21:10  1 mg





  ONETIME ONE   Administration





     





     





     





     


 


Metoclopramide HCl  10 mg  19 20:17  19 20:41





  Reglan  IVPUSH  19 20:18  10 mg





  ONETIME ONE   Administration





     





     





     





     














- Radiology Interpretation


Free Text/Narrative:: 





Chest x-ray, PA and lateral, shows evidence of mild COPD changes with 

additional mild pulmonary hypertension and/or centralized CHF with additional 

mild left-sided pleural effusion. Moderate post arthritic changes in the 

thoracic spine with no pneumothorax, pulmonary infiltrates, cardiomegaly, etc.





Departure





- Departure


Time of Disposition: 22:15


Disposition: Home, Self-Care 01


Condition: Good


Clinical Impression: 


 Peptic ulcer disease, Tobacco abuse counseling, Renal insufficiency, CHF, 

Congestive heart failure, Hypomagnesemia





COPD (chronic obstructive pulmonary disease)


Qualifiers:


 COPD type: emphysema Emphysema type: panlobular Qualified Code(s): J43.1 - 

Panlobular emphysema





Osteoarthritis


Qualifiers:


 Osteoarthritis location: multiple joints Osteoarthritis type: primary 

Qualified Code(s): M15.0 - Primary generalized (osteo)arthritis





Hypertension


Qualifiers:


 Hypertension type: essential hypertension Qualified Code(s): I10 - Essential (

primary) hypertension





Sleep apnea


Qualifiers:


 Sleep apnea type: unspecified type Qualified Code(s): G47.30 - Sleep apnea, 

unspecified





Migraine headache


Qualifiers:


 Migraine type: without aura Status migrainosus presence: without status 

migrainosus Intractability: not intractable Qualified Code(s): G43.009 - 

Migraine without aura, not intractable, without status migrainosus





Anemia


Qualifiers:


 Anemia type: unspecified type Qualified Code(s): D64.9 - Anemia, unspecified








- Discharge Information


*PRESCRIPTION DRUG MONITORING PROGRAM REVIEWED*: Not Applicable


*COPY OF PRESCRIPTION DRUG MONITORING REPORT IN PATIENT [facility]: Not Applicable


Prescriptions: 


Furosemide 20 mg PO BID #1 tablet


Magnesium Oxide 400 mg PO BID #20 tab


Instructions:  Steps to Quit Smoking, Easy-to-Read, Migraine Headache, Easy-to-

Read


Referrals: 


Theodora La NP [Primary Care Provider] - 


Forms:  ED Department Discharge


Additional Instructions: 


1.  Followup with your regular provider in 7 days as directed for reevaluation 

and recommended repeat chest x-ray, CBC, CRP, basic metabolic panel, BNP, uric 

acid level, and magnesium level. Bring these discharge instructions with you to 

that visit.


2.  Tylenol 650 mg by mouth every 4 hours when necessary as directed.


3.  Ice packs to head and neck, dark and quiet room, etc. as directed until 

headache resolves.


4.  Sedation precautions with no driving, etc. for 18 hours because of 

emergency room medications. 


5.  No Flexeril or amitriptyline this evening with next dose of ibuprofen in 6 

hours as needed and next dose of Benadryl and 4 hours as needed secondary to 

medications given in the emergency room.


6.  Once again discuss with your regular provider at follow-up possible 

discontinuation of lisinopril HCT secondary to your allergic reaction/

angioedema earlier this morning and also discuss current changes in medical 

therapy, including new magnesium oxide, increased Lasix therapy, etc.


7.  Stop all tobacco exposure ASAP as directed with counselling, information, 

etc. given


8.  Discuss with your regular provider new additional options for CPAP masks as 

discussed earlier today


9.  Immediately after this visit verify that your cellular telephone's 

voicemail has been activated and is empty. Also verify that your  home telephone

's answering machine is operating properly and has space to receive messages. 

Note that it is sometimes necessary for us to be able to contact you at a later 

date to discuss your medical care. 


10.  Please remember that we are ALWAYS here for you and want to answer any 

questions you may have. Feel free to call the hospital any time and we call you 

back ASAP.  





- Problem List & Annotations


(1) Osteoarthritis


SNOMED Code(s): 061794998


   Code(s): M19.90 - UNSPECIFIED OSTEOARTHRITIS, UNSPECIFIED SITE   Status: 

Chronic   Priority: Medium   Current Visit: Yes   Annotation/Comment:: 

Exacerbation of his chronic pain syndrome with mild CRP elevation with previous 

rheumatology consultation in [place] and use of biologic the past by his history. 

Note previous and current chronic narcotic use as above. Continue to observe 

closely by his regular provider.   


Qualifiers: 


   Osteoarthritis location: multiple joints   Osteoarthritis type: primary   

Qualified Code(s): M15.0 - Primary generalized (osteo)arthritis   





(2) Migraine headache


SNOMED Code(s): 94973700


   Code(s): G43.909 - MIGRAINE, UNSP, NOT INTRACTABLE, WITHOUT STATUS 

MIGRAINOSUS   Status: Acute   Priority: High   Current Visit: Yes   Onset Date: 

19   Annotation/Comment:: Aggressive treatment in the emergency room as 

above. IV magnesium sulfate given in the emergency room with additional 

initiation magnesium oxide as migraine prophylaxis and treatment for his 

hypomagnesemia. Sedation precautions given.   


Qualifiers: 


   Migraine type: without aura   Status migrainosus presence: without status 

migrainosus   Intractability: not intractable   Qualified Code(s): G43.009 - 

Migraine without aura, not intractable, without status migrainosus   





(3) CHF, Congestive heart failure


SNOMED Code(s): 82021578


   Code(s): I50.9 - HEART FAILURE, UNSPECIFIED   Status: Acute   Priority: 

Medium   Current Visit: Yes   Onset Date: 19   Annotation/Comment:: No 

chest pain or anginal type symptoms. Mild BNP elevation and evidence of mild 

CHF by today's chest x-ray. Last echocardiogram on 1/15/16 with results as 

above. Patient is already on an ACE inhibitor, however note angioedema earlier 

today. Further cardiac workup depending on his clinical course, including 

repeat echocardiogram, etc.. Cardiac enzymes are normal. Note some nonfasting 

hyperglycemia and WBC elevation today likely secondary to IV Solu-Medrol, which 

was given earlier this morning.   





(4) COPD (chronic obstructive pulmonary disease)


SNOMED Code(s): 46472352


   Code(s): J44.9 - CHRONIC OBSTRUCTIVE PULMONARY DISEASE, UNSPECIFIED   Status

: Chronic   Priority: Medium   Current Visit: Yes   Annotation/Comment:: Stable 

by history with no recent history of fever, bronchitic type symptoms, etc.   


Qualifiers: 


   COPD type: emphysema   Emphysema type: panlobular   Qualified Code(s): J43.1 

- Panlobular emphysema   





(5) Hypertension


SNOMED Code(s): 32674971


   Code(s): I10 - ESSENTIAL (PRIMARY) HYPERTENSION   Status: Chronic   Priority

: Medium   Current Visit: Yes   Annotation/Comment:: Somewhat elevated in the 

emergency room initially, however stable prior to discharge without any 

additional medications. Continue to observe closely by regular provider.   


Qualifiers: 


   Hypertension type: essential hypertension   Qualified Code(s): I10 - 

Essential (primary) hypertension   





(6) Peptic ulcer disease


SNOMED Code(s): 83499118


   Code(s): K27.9 - PEPTIC ULC, SITE UNSP, UNSP AS AC OR CHR, W/O HEMOR OR PERF

   Status: Chronic   Priority: Medium   Current Visit: Yes   Annotation/Comment:

: Stable by history with history of distant GI bleed secondary to NSAID abuse. 

Note high-dose IV Pepcid given in the emergency room earlier this morning.   





(7) Renal insufficiency


SNOMED Code(s): 184712223, 059239446


   Code(s): N28.9 - DISORDER OF KIDNEY AND URETER, UNSPECIFIED   Status: 

Chronic   Priority: Medium   Current Visit: Yes   Annotation/Comment:: Note 

previous history of renal insufficiency with normal renal function today. 

Continue to observe closely by his regular provider secondary to increased 

Lasix therapy today. Note patient was previously taking his Lasix on a daily 

rather than when necessary basis with no potassium supplement. Note current 

lisinopril HCTZ therapy. Follow-up blood work recommended as per discharge 

instructions.   





(8) Sleep apnea


SNOMED Code(s): 86200357


   Code(s): G47.30 - SLEEP APNEA, UNSPECIFIED   Status: Chronic   Priority: 

Medium   Current Visit: Yes   Annotation/Comment:: He has been noncompliant 

with his CPAP. He will discuss this further with his regular provider at follow-

up with his also discussed during today's visit. Note obesity with weight loss 

in moderation advisable.   


Qualifiers: 


   Sleep apnea type: unspecified type   Qualified Code(s): G47.30 - Sleep apnea

, unspecified   





(9) Tobacco abuse counseling


SNOMED Code(s): 636382953, 224550258, 159796769


   Code(s): Z71.6 - TOBACCO ABUSE COUNSELING   Status: Chronic   Priority: 

Medium   Current Visit: Yes   Annotation/Comment:: The patient's wife was 

strongly encouraged to discontinue tobacco use with information provided at 

discharge.   





(10) Hyponatremia


SNOMED Code(s): 84828716


   Code(s): E87.1 - HYPO-OSMOLALITY AND HYPONATREMIA   Status: Chronic   

Priority: Medium   Current Visit: Yes   Annotation/Comment:: Secondary to his 

CHF. Increase of Lasix therapy as above.   





(11) Anemia


SNOMED Code(s): 772129774


   Code(s): D64.9 - ANEMIA, UNSPECIFIED   Status: Chronic   Priority: Medium   

Current Visit: Yes   Annotation/Comment:: Mild borderline anemia today with the 

current iron and vitamin B-12 supplements   


Qualifiers: 


   Anemia type: unspecified type   Qualified Code(s): D64.9 - Anemia, 

unspecified   





(12) Hypomagnesemia


SNOMED Code(s): 200090327


   Code(s): E83.42 - HYPOMAGNESEMIA   Status: Acute   Priority: Medium   

Current Visit: Yes   Onset Date: 19   Annotation/Comment:: As above   





- Problem List Review


Problem List Initiated/Reviewed/Updated: Yes





- My Orders


Last 24 Hours: 


My Active Orders





19 20:16


Obtain Past Medical Record [OM.PC] Routine 





19 20:18


Peripheral IV Care [RC] .AS DIRECTED 


Sodium Chloride 0.9% [Saline Flush]   10 ml FLUSH ASDIRECTED PRN 


Peripheral IV Insertion Adult [OM.PC] Routine 





19 20:46


CULTURE URINE [RM] Routine 





19 21:08


Chest 2V [CR] Urgent 














- Assessment/Plan


Last 24 Hours: 


My Active Orders





19 20:16


Obtain Past Medical Record [OM.PC] Routine 





19 20:18


Peripheral IV Care [RC] .AS DIRECTED 


Sodium Chloride 0.9% [Saline Flush]   10 ml FLUSH ASDIRECTED PRN 


Peripheral IV Insertion Adult [OM.PC] Routine 





19 20:46


CULTURE URINE [RM] Routine 





19 21:08


Chest 2V [CR] Urgent 











Assessment:: 





As above


Plan: 





As above. Extensive precautions were given to the patient and his wife, who are 

in agreement with the treatment plan. See Patient Instructions for further 

treatment and plan.

## 2019-07-23 NOTE — PCM.HP
H&P History of Present Illness





- General


Date of Service: 19


Admit Problem/Dx: 


 Admission Diagnosis/Problem





Admission Diagnosis/Problem      Fall





49 yowm mechanical fall (tripped on carpet) at friend's house sustained the 

distal spiral left femur fracture. He was hospitalized at [facility]. He had 

acute blood loss anemia,and significant hyponatremia (Na 115). Surgery was 

postponed until medical therapy to correct the hyponatremia. He went to the OR 

for ORIF of the left distal femur on 19. He had post-op anemia requiring 

blood transfusion. He had significant pain management issues with consult with 

pain doctor. He also has chronic pain. He was transferred to [facility] for 

continuation of pain control, for electrolyte management and for wound care. He 

did require a wound vac post op but incision is well healed at this time with 

staples out today.


Source of Information: Patient, Old Records


History Limitations: Reports: No Limitations





- History of Present Illness


Symptom Onset Date: 19


Duration of Symptoms: Reports: Improving


Location: Reports: Upper Extremity, Right, Lower Extremity, Left


Quality: Reports: Same as Previous Episode


Severity: Moderate


Improves with: Reports: Cold Therapy, Immobilization, Medication, Rest


Associated Symptoms: Reports: No Other Symptoms





- Related Data


Allergies/Adverse Reactions: 


 Allergies











Allergy/AdvReac Type Severity Reaction Status Date / Time


 


celecoxib [From Celebrex] Allergy  unknown Verified 19 14:42


 


fentanyl Allergy  Agitation, Verified 19 16:32





   Hallucination  


 


gabapentin Allergy  Seizure Verified 19 16:32


 


hydrochlorothiazide Allergy  unknown Verified 19 14:42


 


naproxen [From Aleve] Allergy  Other Verified 19 20:13











Home Medications: 


 Home Meds





Multivitamin [Daily Vitamin] 1 tab PO QPM 01/27/15 [History]


Omeprazole [Prilosec] 1 cap PO DAILY 03/13/15 [History]


Cyclobenzaprine [Flexeril] 10 mg PO TID PRN 18 [History]


Escitalopram [Lexapro] 20 mg PO DAILY 18 [History]


Acetaminophen [Tylenol Extra Strength] 1,000 mg PO TID 19 [History]


Amitriptyline [Elavil] 25 mg PO BEDTIME 19 [History]


Ascorbic Acid 500 mg PO QPM 19 [History]


Calcium Citrate/Vitamin D3 [Calcitrate + Vit D Cap (315 MG/250 UNITS)] 1 tab PO 

BID 19 [History]


Cholecalciferol (Vitamin D3) [Vitamin D3] 4 tab PO QPM 19 [History]


Cyanocobalamin (Vitamin B12) [Vitamin B12] 250 mcg PO QPM 19 [History]


Doxycycline [Vibramycin] 100 mg PO BIDX7D 19 [History]


Enoxaparin [Lovenox] 40 mg SUBCUT Q12HR 19 [History]


Ferrous Sulfate 325 mg PO QPM 19 [History]


Folic Acid 1 mg PO QPM 19 [History]


Magnesium Oxide 400 mg PO QPM 19 [History]


Melatonin 6 mg PO BEDTIME PRN 19 [History]


Polyethylene Glycol 3350 [MiraLAX] 17 gm PO DAILY 19 [History]


Sennosides/Docusate Sodium [Senna Plus Tablet] 2 tab PO BID 19 [History]


Thiamine [Vitamin B-1] 100 mg PO QPM 19 [History]


oxyCODONE 10 mg PO Q4HR PRN 19 [History]


oxyCODONE 20 mg PO Q6HR 19 [History]











Past Medical History


HEENT History: Reports: Impaired Vision, Other (See Below)


Other HEENT History: He uses OTC reading glasses. Dry eye syndrome in the left 

eye secondary to previous Bell's palsy?


Cardiovascular History: Reports: Hypertension, Syncope, Other (See Below)


Other Cardiovascular History: Patient denies hyperlipidemia despite history of 

LFTs elevation and obesity. Syncopal episode in 2016 secondary to postoperative 

bleeding from pannulectomy. History of d-dimer elevation.


Respiratory History: Reports: Bronchitis, Recurrent, COPD, Intubation, Previous

, Pneumonia, Recurrent, Sleep Apnea, Other (See Below)


Other Respiratory History: He is noncompliant with his CPAP.


Gastrointestinal History: Reports: Cholelithiasis, Diverticulosis, Gastritis, 

GERD, GI Bleed, Inflammatory Bowel Disease, PUD, Other (See Below)


Other Gastrointestinal History: History of upper GI bleed secondary to peptic 

ulcer disease and NSAID abuse in . Crohn's disease by blood work however 

negative follow-up colonoscopies by patient history as below. LFTs elevation 

likely secondary to fatty liver. Recurrent  diverticulitis.


Genitourinary History: Reports: Chronic Renal Insuffiency, Renal Calculus, 

Other (See Below)


Other Genitourinary History: Right-sided urolithiasis in  with spontaneous 

passage.


Musculoskeletal History: Reports: Arthritis, Back Pain, Chronic, Neck Pain, 

Chronic, Osteoarthritis, Other (See Below)


Other Musculoskeletal History: Ankylosis spondylitis. Proximal left fibular 

fracture in  not requiring surgery. Rib contusion on 3/13/15 with suspected 

fracture however negative x-rays. Chronic pain syndrome.


Neurological History: Reports: Headaches, Chronic, Migraines, Neuropathy, 

Peripheral, Other (See Below)


Other Neuro History: Left Sided Bell's palsy . Benign resting tremor.


Psychiatric History: Reports: Addiction, Anxiety, Depression, Hallucinations, 

Other (See Below)


Other Psychiatric History: Alcohol abuse as below with additional history of 

chronic narcotic use secondary to chronic pain syndrome. History of 

intermittent hallucinations and confusion secondary to previous narcotic 

medications, etc.


Endocrine/Metabolic History: Reports: Obesity/BMI 30+, Vitamin D Deficiency, 

Other (See Below)


Other Endocrine/Metabolic History: Hyponatremia. Hypoglycemia.


Hematologic History: Reports: Anemia, B12 Deficiency, Blood Transfusion(s), 

Iron Deficiency, Other (See Below)


Other Hematologic History: Patient with 7 units of packed red blood cells 

transfused secondary postoperative bleeding .


Immunologic History: Reports: None


Oncologic (Cancer) History: Reports: None


Dermatologic History: Reports: Angiodema, Eczema, Other (See Below)


Other Dermatologic History: Angioedema at age 10 with additional episode on 3/26

/19 of unknown etiology.





- Infectious Disease History


Infectious Disease History: Reports: Chicken Pox





- Past Surgical History


Head Surgeries/Procedures: Reports: None


HEENT Surgical History: Reports: Oral Surgery, Other (See Below)


Other HEENT Surgeries/Procedures: [place] teeth extraction 3 . Additional 

teeth extractions.


Cardiovascular Surgical History: Reports: Varicose


Other Cardiovascular Surgeries/Procedures: Proximal left leg varicose veins 

removal at time of pannulectomy.


Respiratory Surgical History: Reports: None


GI Surgical History: Reports: Appendectomy, Bariatric Procedure, Cholecystectomy

, Colonoscopy, Hernia, Inguinal, Other (See Below)


Other GI Surgeries/Procedures: Laparoscopic appendectomy on 1/28/15. Mariya-en-Y 

gastric bypass in  with with concomitant cholecystectomy. Pannulectomy 

initially on 16 with subsequent postoperative bleeding requiring hematoma 

evacuation and blood transfusion on 16. Bilateral inguinal hernia repair 

including mesh placement on 3/24/15. Last colonoscopy in about . 

Hemorrhoidectomy 2 initially in  and then in 2017.


Male  Surgical History: Reports: Circumcision, Other (See Below)


Other Male  Surgeries/Procedures: Circumcision as an infant.


Endocrine Surgical History: Reports: None


Neurological Surgical History: Reports: None


Musculoskeletal Surgical History: Reports: Arthroscopic Knee, Arthroscopic 

Procedure, Carpal Tunnel, Shoulder Surgery, Other (See Below)


Other Musculoskeletal Surgeries/Procedures:: Right carpal tunnel release in 

about  with left carpal tunnel release in 2017. Right wrist ganglion 

excision 2 in  and . Left shoulder open rotator cuff repair in 2017. Right knee laparoscopic medial meniscus repair in 2015.  Left 

femur ORIF


Dermatological Surgical History: Reports: Plastic Surgical Reconstruction/Repair

, Other (See Below)





- Past Imaging History


Past Imaging History: Reports: Cardiac Echo (Negative echocardiogram on 11/15/

16 with ejection fraction of 60%.), CAT Scan (CT of the head on 3/23/18. CT of 

the abdomen and pelvis on 16 and 16.), MRI (MRI of the left shoulder 

on 18. MRI of the right knee on 12/8/15. MRI of the brain on 13. MRI 

of the right ankle and 14.), PFT (18), Stress Testing (Negative 

Cardiolite Lexiscan on 11/15/16 with ejection fraction of 51%.)





Social & Family History





- Family History


HEENT: Reports: None


Cardiac: Reports: Blood Clots/VTE/DVT, CAD, Heart Failure, High Cholesterol, 

Hypertension, MI, Stent, Other (See Below)


Other Cardiac Family History: Maternal grandmother with fatal MI in her 70s. 

Father with cardiomyopathy and CHF. Paternal uncle with cardiomyopathy with 

fatal CHF at age 71. Paternal uncle with MI at age 50 with history of PTCA/

stents. Hypertension in father, paternal grandfather, maternal grandfather, 

maternal grandmother, maternal uncle and mother. Hyperlipidemia  in all family 

members as above. Paternal grandmother with DVT.


Respiratory: Reports: Asthma, COPD, Pneumothorax, Sleep Apnea, Other (See Below)


Other Respiratory Family Hisory: Paternal uncle COPD, sleep apnea, and 

pneumothorax with history of tobacco use. Another paternal uncle with COPD and 

possible asthma today with history of tobacco use. Father with COPD with 

history of tobacco use.


GI: Reports: None


: Reports: None


OBGYN: Reports: Recurrent Spontaneous 


Other OBGYN Family History: Maternal Grandmother with recurrent SAB.


Musculoskeletal: Reports: Arthritis, Osteoarthritis, RA, Other (See Below)


Other Musculoskeletal Family History: Father and maternal grandfather with 

rheumatoid arthritis. Maternal grandparents with osteoarthritis.


Neurological: Reports: Other (See Below)


Other Neurological Family History: Paternal grandmother with history of OBS.


Psychiatric: Reports: Anxiety, Depression, Psychosis, Schizophrenia, Other (See 

Below)


Other Psychiatric Family History: Maternal grandmother anxiety depression 

disorder. Maternal aunt 2 with history of psychosis. Brother with 

schizophrenia. Paternal uncle with history of alcohol abuse.


Endocrine/Metabolic: Reports: Diabetes, type II, IDDM, Other (See Below)


Other Endocrine/Metabolic Family History: Father with AODM. Paternal uncle and 

2 with IDDM. Obesity in father, maternal grandparents, brothers 2 paternal 

uncles 2, maternal uncle 1, and  maternal aunts 4


Hematologic: Reports: None


Immunologic: Reports: None


Dermatologic: Reports: None


Oncologic: Reports: Brain, Lung, Lymphoma, Pancreatic, Prostate, Other (See 

Below)


Other Oncologic Family History: Mother with fatal lung cancer at age 63 with 

history of tobacco use. Father with history of fatal brain tumor at age 60 with 

history of tobacco use. Paternal uncle with history of prostate cancer and 

fatal pancreatic cancer at age 61 with history of alcohol abuse. Maternal uncle 

with fatal lung cancer at age 49 with history of tobacco use. Maternal aunt 

with fatal lymphoma at age 72.





- Tobacco Use


Smoking Status *Q: Never Smoker


Second Hand Smoke Exposure: Yes


Source of Second Hand Smoke Exposure: parents, uncles


Second Hand Smoke Education Provided: Yes





- Caffeine Use


Caffeine Use: Reports: Soda





- Recreational Drug Use


Recreational Drug Use: No





- Living Situation & Occupation


Living situation: Reports:  (. 2 children.), with Family (Wife)


Occupation: Disabled (Secondary to his arthritis since 2018.)





H&P Review of Systems





- Review of Systems:


Review Of Systems: See Below


General: Reports: No Symptoms


HEENT: Reports: No Symptoms


Pulmonary: Reports: Shortness of Breath


Cardiovascular: Reports: No Symptoms


Gastrointestinal: Reports: No Symptoms


Genitourinary: Reports: No Symptoms


Musculoskeletal: Reports: Shoulder Pain (right, MRI revealed rotator muscle tear

), Back Pain (chronic), Leg Pain (left thigh), Joint Pain (multiple)


Skin: Reports: Bruising


Psychiatric: Reports: No Symptoms


Neurological: Reports: Numbness (fingers, toes), Paresthesia (fingers, toes), 

Pre-Existing Deficit


Hematologic/Lymphatic: Reports: Anemia


Immunologic: Reports: Other (white milk and milk products, he can tolerate 

chocolate milk)





Exam





- Exam


Exam: See Below





- Vital Signs


Vital Signs: 


 Last Vital Signs











Temp  99 F   19 20:25


 


Pulse  82   19 20:25


 


Resp  16   19 20:25


 


BP  111/59 L  19 20:25


 


Pulse Ox  95   19 20:25











Weight: 380 lb 8 oz





- Exam


Quality Assessment: DVT Prophylaxis


General: Alert, Cooperative


HEENT: Conjunctiva Clear, Other (assymetry eye palpebral opening secondary to 

Bell's Palsy)


Neck: Trachea Midline


Lungs: Normal Respiratory Effort, Decreased Breath Sounds


Cardiovascular: Regular Rate, Regular Rhythm


GI/Abdominal Exam: Soft, Non-Tender, No Distention


 (Male) Exam: Deferred


Rectal (Males) Exam: Deferred


Back Exam: Decreased Range of Motion, Paraspinal Tenderness, Vertebral 

Tenderness


Extremities: No Pedal Edema, Arm Pain (right shoulder), Leg Pain (left thigh)


Skin: Warm, Dry, Intact, Ecchymosis


Neurological: Normal Speech, Focal Deficit (peripheral neuropathy to bilateral 

hands and feet), Other (Charcot joints bilateral feet/toes)


Neuro Extensive - Mental Status: Alert, Normal Mood/Affect, Normal Cognition, 

Memory Intact


Neuro Extensive - Motor, Sensory, Reflexes: Facial palsy (L) (old Bell's Palsy)

, Abnormal Sensation (fingers and toes)


Psychiatric: Alert, Normal Affect, Normal Mood





*Q Meaningful Use (ADM)





- VTE *Q


VTE Mechanical Contraindications *Q: At Risk for Falls





- Problem List


(1) Orthopedic aftercare


SNOMED Code(s): 372668715


   ICD Code: Z47.89 - ENCOUNTER FOR OTHER ORTHOPEDIC AFTERCARE   Status: Acute 

  Priority: High   Current Visit: Yes   





(2) Femur fracture, left


SNOMED Code(s): 32040517


   ICD Code: S72.92XA - UNSP FRACTURE OF LEFT FEMUR, INIT ENCNTR FOR CLOSED 

FRACTURE   Status: Acute   Priority: High   Current Visit: Yes   


Qualifiers: 


   Encounter type: subsequent encounter   Femur location: distal, unspecified 

portion   Fracture type: closed   Fracture morphology: other fracture   

Fracture healing: with routine healing   Qualified Code(s): S72.492D - Other 

fracture of lower end of left femur, subsequent encounter for closed fracture 

with routine healing   





(3) Fall due to stumbling


SNOMED Code(s): 25721791


   ICD Code: W01.0XXA - FALL SAME LEV FROM SLIP/TRIP W/O STRIKE AGAINST OBJECT, 

INIT   Status: Suspected   Priority: High   Current Visit: Yes   


Qualifiers: 


   Encounter type: subsequent encounter   Qualified Code(s): W01.0XXD - Fall on 

same level from slipping, tripping and stumbling without subsequent striking 

against object, subsequent encounter   





(4) Rotator cuff tear arthropathy of right shoulder


SNOMED Code(s): 39356439016172795


   ICD Code: M75.101 - UNSP ROTATR-CUFF TEAR/RUPTR OF RIGHT SHOULDER, NOT TRAUMA

; M12.811 - OTH SPECIFIC ARTHROPATHIES, NEC, RIGHT SHOULDER   Status: Acute   

Priority: High   Current Visit: Yes   





(5) Hypomagnesemia


SNOMED Code(s): 774658551


   ICD Code: E83.42 - HYPOMAGNESEMIA   Status: Acute   Priority: Medium   

Current Visit: No   Onset Date: 19   Problem Details: As above   





(6) Hyponatremia


SNOMED Code(s): 87468275


   ICD Code: E87.1 - HYPO-OSMOLALITY AND HYPONATREMIA   Status: Acute   Current 

Visit: No   Problem Details: Sodium improved 132 although still low we'll send 

him home on a free salt diet and limited water intake to 1500 a day   





(7) Hypotension


SNOMED Code(s): 00863164


   ICD Code: I95.9 - HYPOTENSION, UNSPECIFIED   Status: Acute   Current Visit: 

No   


Qualifiers: 


   Hypotension type: postprocedural hypotension   Qualified Code(s): I95.81 - 

Postprocedural hypotension   





(8) Anemia


SNOMED Code(s): 323752320


   ICD Code: D64.9 - ANEMIA, UNSPECIFIED   Status: Chronic   Priority: Medium   

Current Visit: No   Problem Details: Mild borderline anemia today with the 

current iron and vitamin B-12 supplements   


Qualifiers: 


   Anemia type: unspecified type   Qualified Code(s): D64.9 - Anemia, 

unspecified   





(9) COPD (chronic obstructive pulmonary disease)


SNOMED Code(s): 47342537


   ICD Code: J44.9 - CHRONIC OBSTRUCTIVE PULMONARY DISEASE, UNSPECIFIED   Status

: Chronic   Priority: Medium   Current Visit: No   Problem Details: Stable by 

history with no recent history of fever, bronchitic type symptoms, etc.   


Qualifiers: 


   COPD type: emphysema   Emphysema type: panlobular   Qualified Code(s): J43.1 

- Panlobular emphysema   





(10) Hypertension


SNOMED Code(s): 44633669


   ICD Code: I10 - ESSENTIAL (PRIMARY) HYPERTENSION   Status: Chronic   Priority

: Medium   Current Visit: No   Problem Details: Somewhat elevated in the 

emergency room initially, however stable prior to discharge without any 

additional medications. Continue to observe closely by regular provider.   


Qualifiers: 


   Hypertension type: essential hypertension   Qualified Code(s): I10 - 

Essential (primary) hypertension   





(11) Osteoarthritis


SNOMED Code(s): 482257593


   ICD Code: M19.90 - UNSPECIFIED OSTEOARTHRITIS, UNSPECIFIED SITE   Status: 

Chronic   Priority: Medium   Current Visit: No   Problem Details: Exacerbation 

of his chronic pain syndrome with mild CRP elevation with previous rheumatology 

consultation in [place] and use of biologic the past by his history. Note 

previous and current chronic narcotic use as above. Continue to observe closely 

by his regular provider.   


Qualifiers: 


   Osteoarthritis location: multiple joints   Osteoarthritis type: primary   

Qualified Code(s): M15.0 - Primary generalized (osteo)arthritis   





(12) Renal insufficiency


SNOMED Code(s): 983258861, 989932613


   ICD Code: N28.9 - DISORDER OF KIDNEY AND URETER, UNSPECIFIED   Status: 

Chronic   Priority: Medium   Current Visit: No   Problem Details: Note previous 

history of renal insufficiency with normal renal function today. Continue to 

observe closely by his regular provider secondary to increased Lasix therapy 

today. Note patient was previously taking his Lasix on a daily rather than when 

necessary basis with no potassium supplement. Note current lisinopril HCTZ 

therapy. Follow-up blood work recommended as per discharge instructions.   





(13) Mixed anxiety and depressive disorder


SNOMED Code(s): 315036340


   ICD Code: F41.8 - OTHER SPECIFIED ANXIETY DISORDERS   Status: Acute   

Priority: High   Current Visit: Yes   





(14) Ankylosing spondylitis


SNOMED Code(s): 0101385


   ICD Code: M45.9 - ANKYLOSING SPONDYLITIS OF UNSPECIFIED SITES IN SPINE   

Status: Chronic   Current Visit: Yes   


Qualifiers: 


   Ankylosing spondylitis location: multiple sites in spine   Qualified Code(s)

: M45.0 - Ankylosing spondylitis of multiple sites in spine   





(15) Sleep apnea, obstructive


SNOMED Code(s): 22388336


   ICD Code: G47.33 - OBSTRUCTIVE SLEEP APNEA (ADULT) (PEDIATRIC)   Status: 

Chronic   Priority: Medium   Current Visit: Yes   





(16) Obesities, morbid


SNOMED Code(s): 082867110


   ICD Code: E66.01 - MORBID (SEVERE) OBESITY DUE TO EXCESS CALORIES   Status: 

Acute   Priority: High   Current Visit: Yes   


Problem List Initiated/Reviewed/Updated: Yes


Orders Last 24hrs: 


 Active Orders 24 hr











 Category Date Time Status


 


 Patient Status [ADT] Routine ADT  19 14:05 Active


 


 Communication, Vaccine [RC] PER UNIT ROUTINE Care  19 14:10 Active


 


 Height and Weight [RC] PER UNIT ROUTINE Care  19 14:08 Active


 


 May Shower [RC] ASDIRECTED Care  19 14:05 Active


 


 Oxygen Therapy [RC] PRN Care  19 14:05 Active


 


 Up With Assistance [RC] ASDIRECTED Care  19 14:05 Active


 


 VTE/DVT Education [RC] PER UNIT ROUTINE Care  19 14:05 Active


 


 Vaccines to be Administered [RC] PER UNIT ROUTINE Care  19 14:10 Active


 


 Vital Signs [RC] PER UNIT ROUTINE Care  19 14:05 Active


 


 Consult to Case Management/ [CONS] Cons  19 14:05 Active





 Routine   


 


 OT Evaluation and Treatment [CONS] Routine Cons  19 14:05 Active


 


 PT Evaluation and Treatment [CONS] Routine Cons  19 14:05 Active


 


 Regular Diet [DIET] Diet  19 Dinner Active


 


 Acetaminophen [Tylenol Extra Strength] Med  19 00:00 Active





 1,000 mg PO Q6H   


 


 Amitriptyline [Elavil] Med  19 20:00 Active





 25 mg PO BEDTIME   


 


 Ascorbic Acid [Vitamin C] Med  19 17:00 Active





 500 mg PO DAILY@1700   


 


 Calcium Carbonate/Vitamin D3 [OystCal-D 625 MG-125 Med  19 22:00 Active





 Units]   





 1 tab PO BID@1000,2200   


 


 Cholecalciferol (Vitamin D3) [Vitamin D3] Med  19 08:00 Active





 100 mcg PO DAILY   


 


 Cyanocobalamin (Vitamin B12) [Vitamin B12] Med  19 08:00 Active





 250 mcg PO DAILY   


 


 Cyclobenzaprine [Flexeril] Med  19 14:11 Active





 10 mg PO TID PRN   


 


 Docusate Sodium/Sennosides [Senna Plus] Med  19 18:00 Active





 2 tab PO BID   


 


 Doxycycline [Vibramycin] Med  19 19:45 Active





 0 mg PO BID   


 


 Enoxaparin [Lovenox] Med  19 20:00 Active





 40 mg SUBCUT Q12HR   


 


 Escitalopram [Lexapro] Med  19 08:00 Active





 20 mg PO DAILY   


 


 Ferrous Sulfate Med  19 17:00 Active





 325 mg PO DAILY@1700   


 


 Folic Acid Med  19 08:00 Active





 1 mg PO DAILY   


 


 Magnesium Oxide Med  19 08:00 Active





 400 mg PO DAILY   


 


 Melatonin Med  19 15:57 Active





 6 mg PO BEDTIME PRN   


 


 Multivitamins [Tab-A-Sudha] Med  19 08:00 Active





 1 tab PO DAILY   


 


 Non-Formulary Medication [NF Drug] Med  19 22:00 Active





 1 each PO Q4H   


 


 Non-Formulary Medication [NF Drug] Med  19 16:00 Active





 1 each PO Q4HR PRN   


 


 Omeprazole Med  19 21:52 Active





 20 mg PO DAILY PRN   


 


 Thiamine [Vitamin B-1] Med  19 08:00 Active





 100 mg PO DAILY   


 


 GM Immunization Reflex [OM.PC] Click To Edit Oth  19 14:05 Ordered


 


 Patient May Not [OM.PC] Click To Edit Oth  19 14:05 Ordered


 


 Resuscitation Status Routine Resus Stat  19 14:05 Ordered








 Medication Orders





Acetaminophen (Tylenol Extra Strength)  1,000 mg PO Q6H TAISHA


Amitriptyline HCl (Elavil)  25 mg PO BEDTIME TAISHA


   Last Admin: 19 19:05  Dose: 25 mg


Ascorbic Acid (Vitamin C)  500 mg PO DAILY@1700 [facility]


   Last Admin: 19 19:01  Dose: 500 mg


Calcium Carbonate (Oystcal-D 625 Mg-125 Units)  1 tab PO BID@1000,2200 [facility]


   Last Admin: 19 22:24  Dose: 1 tab


Cholecalciferol (Vitamin D3)  100 mcg PO DAILY [facility]


Cyanocobalamin (Vitamin B12)  250 mcg PO DAILY [facility]


Cyclobenzaprine HCl (Flexeril)  10 mg PO TID PRN


   PRN Reason: spasm,pain


   Last Admin: 19 21:04  Dose: 10 mg


Doxycycline Hyclate (Vibramycin)  0 mg PO BID [facility]


   Stop: 19 08:01


   Last Admin: 19 19:59 Dose:  Not Given


Enoxaparin Sodium (Lovenox)  40 mg SUBCUT Q12HR [facility]


   Stop: 19 20:01


   Last Admin: 19 19:05  Dose: 40 mg


Escitalopram Oxalate (Lexapro)  20 mg PO DAILY [facility]


Ferrous Sulfate (Ferrous Sulfate)  325 mg PO DAILY@1700 [facility]


   Last Admin: 19 19:00  Dose: 325 mg


Folic Acid (Folic Acid)  1 mg PO DAILY [facility]


Magnesium Oxide (Magnesium Oxide)  400 mg PO DAILY [facility]


Melatonin (Melatonin)  6 mg PO BEDTIME PRN


   PRN Reason: INSOMNIA


   Last Admin: 19 21:04  Dose: 6 mg


Multivitamins/Minerals/Vitamin C (Tab-A-Sudha)  1 tab PO DAILY [facility]


Oxycodone Ir 10mg (Tablets)  1 each PO Q4HR PRN


   PRN Reason: PAIN 4-10


   Last Admin: 19 21:07  Dose: 1 each


Oxycodone Ir 20mg (Tabs  **Own Med**)  1 each PO Q4H [facility]


   Last Admin: 19 22:26  Dose: 1 each


Omeprazole (Omeprazole)  20 mg PO DAILY PRN


   PRN Reason: Heartburn


Senna/Docusate Sodium (Senna Plus)  2 tab PO BID [facility]


   Last Admin: 19 19:01  Dose: 2 tab


Thiamine HCl (Vitamin B-1)  100 mg PO DAILY [facility]








Assessment/Plan Comment:: 





19


Jey Linares MD


See H&P. Admitted to swing bed status to continue PT-OT for distal left femur 

fracture S/P ORIF on 19 at [facility]. He also tore right rotator cuff a 

few days ago and needs PT to right shoulder.

## 2019-08-02 NOTE — PCM.PN
- General Info


Date of Service: 08/02/19


Admission Dx/Problem (Free Text): 


 Admission Diagnosis/Problem





Admission Diagnosis/Problem      Fall





49 yowm mechanical fall (tripped on carpet) at friend's house sustained the 

distal spiral left femur fracture. He was hospitalized at [facility]. He had 

acute blood loss anemia,and significant hyponatremia (Na 115). Surgery was 

postponed until medical therapy to correct the hyponatremia. He went to the OR 

for ORIF of the left distal femur on 07/05/19. He had post-op anemia requiring 

blood transfusion. He had significant pain management issues with consult with 

pain doctor. He also has chronic pain. He was transferred to [facility] for 

continuation of pain control, for electrolyte management and for wound care. He 

did require a wound vac post op but incision is well healed at this time with 

staples out today.


Functional Status: Reports: Pain Controlled, Tolerating Diet





- Review of Systems


General: Reports: No Symptoms


HEENT: Reports: No Symptoms


Pulmonary: Reports: Shortness of Breath (at times due to COPD), Cough (at times 

due to COPD)


Cardiovascular: Reports: No Symptoms


Gastrointestinal: Reports: No Symptoms


Genitourinary: Reports: No Symptoms


Musculoskeletal: Reports: Back Pain (chronic), Leg Pain (left thigh post op 

pain 4 at rest), Foot Pain (neuropathy pain)


Skin: Reports: No Symptoms


Neurological: Reports: No Symptoms


Psychiatric: Reports: No Symptoms





- Patient Data


Vitals - Most Recent: 


 Last Vital Signs











Temp  97.6 F   08/02/19 07:14


 


Pulse  64   08/02/19 07:14


 


Resp  16   08/02/19 07:14


 


BP  126/77   08/02/19 07:14


 


Pulse Ox  97   08/02/19 07:14











Weight - Most Recent: 376 lb 9.6 oz


I&O - Last 24 Hours: 


 Intake & Output











 08/01/19 08/02/19 08/02/19





 22:59 06:59 14:59


 


Intake Total   240


 


Output Total 900 500 


 


Balance -900 -500 240











Med Orders - Current: 


 Current Medications





Acetaminophen (Tylenol Extra Strength)  1,000 mg PO Q6H [facility]ISHA


   Last Admin: 08/02/19 11:30 Dose:  1,000 mg


Albuterol (Ventolin Hfa)  1 gm INH Q4H PRN


   PRN Reason: Dyspnea


   Last Admin: 08/01/19 12:44 Dose:  1 puff


Amitriptyline HCl (Elavil)  25 mg PO BEDTIME@2200 [facility]


   Last Admin: 08/01/19 21:00 Dose:  25 mg


Artificial Tears (Liquitears 1.4% Ophth Soln)  0 ml EYEBOTH Q1H PRN


   PRN Reason: Dry Eyes


   Last Admin: 07/30/19 20:58 Dose:  1 drop


Ascorbic Acid (Vitamin C)  500 mg PO DAILY@1700 [facility]


   Last Admin: 08/01/19 17:27 Dose:  500 mg


Calcium Carbonate (Oystcal-D 625 Mg-125 Units)  1 tab PO BID@1000,2200 [facility]


   Last Admin: 08/02/19 10:36 Dose:  1 tab


Cholecalciferol (Vitamin D3)  100 mcg PO DAILY [facility]


   Last Admin: 08/02/19 07:22 Dose:  100 mcg


Cyanocobalamin (Vitamin B12)  250 mcg PO DAILY [facility]


   Last Admin: 08/02/19 07:22 Dose:  250 mcg


Cyclobenzaprine HCl (Flexeril)  10 mg PO TID PRN


   PRN Reason: spasm,pain


   Last Admin: 08/02/19 02:11 Dose:  10 mg


Enoxaparin Sodium (Lovenox)  40 mg SUBCUT Q12HR [facility]


   Stop: 08/12/19 20:01


   Last Admin: 08/02/19 07:23 Dose:  40 mg


Escitalopram Oxalate (Lexapro)  20 mg PO DAILY [facility]


   Last Admin: 08/02/19 07:23 Dose:  20 mg


Ferrous Sulfate (Ferrous Sulfate)  325 mg PO DAILY@1700 [facility]


   Last Admin: 08/01/19 17:27 Dose:  325 mg


Folic Acid (Folic Acid)  1 mg PO DAILY [facility]


   Last Admin: 08/02/19 07:23 Dose:  1 mg


Magnesium Oxide (Magnesium Oxide)  400 mg PO Q12HR [facility]


   Last Admin: 08/02/19 07:22 Dose:  400 mg


Melatonin (Melatonin)  6 mg PO BEDTIME PRN


   PRN Reason: INSOMNIA


   Last Admin: 08/02/19 01:09 Dose:  6 mg


Multivitamins/Minerals/Vitamin C (Tab-A-Sudha)  1 tab PO DAILY [facility]


   Last Admin: 08/02/19 07:22 Dose:  1 tab


Oxycodone Ir 10mg (Tablets)  1 each PO Q4HR PRN


   PRN Reason: PAIN 4-10


   Last Admin: 08/01/19 23:39 Dose:  1 each


Oxycodone 20 Mg  ** (Own Med**)  0 each PO Q4H [facility]


   Last Admin: 08/02/19 10:36 Dose:  1 each


Omeprazole (Omeprazole)  20 mg PO DAILY PRN


   PRN Reason: Heartburn


   Last Admin: 07/31/19 08:25 Dose:  20 mg


Senna/Docusate Sodium (Senna Plus)  2 tab PO BID [facility]


   Last Admin: 08/02/19 07:24 Dose:  Not Given


Thiamine HCl (Vitamin B-1)  100 mg PO DAILY [facility]


   Last Admin: 08/02/19 07:22 Dose:  100 mg


Triamcinolone Acetonide (Triamcinolone Acetonide 0.1% Crm)  0 gm TOP BID [facility]


   Last Admin: 08/02/19 07:24 Dose:  Not Given





Discontinued Medications





Acetaminophen (Tylenol Extra Strength)  1,000 mg PO TID [facility]


   Last Admin: 07/23/19 19:01 Dose:  1,000 mg


Amitriptyline HCl (Elavil)  25 mg PO BEDTIME [facility]


   Last Admin: 07/25/19 19:16 Dose:  25 mg


Calcium Carbonate (Oystcal-D 625 Mg-125 Units)  1 tab PO DAILY [facility]


Doxycycline Hyclate (Vibramycin)  100 mg PO BID [facility]


   Last Admin: 07/23/19 19:37 Dose:  Not Given


Doxycycline Hyclate (Vibramycin)  0 mg PO BID [facility]


   Last Admin: 07/23/19 19:55 Dose:  100 mg


Doxycycline Hyclate (Vibramycin)  0 mg PO BID [facility]


   Stop: 07/30/19 08:01


   Last Admin: 07/30/19 08:11 Dose:  100 mg


Enoxaparin Sodium (Lovenox)  40 mg SUBCUT Q12HR [facility]


   Stop: 08/03/19 20:01


   Last Admin: 07/26/19 07:50 Dose:  40 mg


Magnesium Oxide (Magnesium Oxide)  400 mg PO DAILY [facility]


   Last Admin: 07/31/19 08:27 Dose:  400 mg


Oxycodone Ir 20mg (Tablets)  1 each PO Q6H [facility]


   Last Admin: 07/23/19 18:50 Dose:  1 each


Oxycodone Ir 20mg (Tabs  **Own Med**)  1 each PO Q4H [facility]


   Last Admin: 07/26/19 17:25 Dose:  1 each


Omeprazole (Omeprazole)  20 mg PO DAILY [facility]


Polyethylene Glycol (Miralax)  17 gm PO DAILY [facility]











- Exam


Quality Assessment: DVT Prophylaxis


General: Alert, Cooperative, No Acute Distress


HEENT: Mucous Membr. Moist/Pink


Neck: Trachea Midline, No JVD


Lungs: Clear to Auscultation, Normal Respiratory Effort


Cardiovascular: Regular Rate, Regular Rhythm


GI/Abdominal Exam: Soft, Non-Tender, No Distention


 (Male) Exam: Deferred


Back Exam: Decreased Range of Motion, Paraspinal Tenderness, Vertebral 

Tenderness


Extremities: Non-Tender


Skin: Warm, Dry, Intact


Wound/Incisions: Healing Well, No Drainage, Erythema Improving


Neurological: No New Focal Deficit, Other (non-weight bearing left leg)


Psy/Mental Status: Alert, Normal Affect, Normal Mood





- Problem List & Annotations


(1) Orthopedic aftercare


SNOMED Code(s): 795410474


   Code(s): Z47.89 - ENCOUNTER FOR OTHER ORTHOPEDIC AFTERCARE   Status: Acute   

Priority: High   Current Visit: Yes   





(2) Femur fracture, left


SNOMED Code(s): 12089680


   Code(s): S72.92XA - UNSP FRACTURE OF LEFT FEMUR, INIT ENCNTR FOR CLOSED 

FRACTURE   Status: Acute   Priority: High   Current Visit: Yes   


Qualifiers: 


   Encounter type: subsequent encounter   Femur location: distal, unspecified 

portion   Fracture type: closed   Fracture morphology: other fracture   

Fracture healing: with routine healing   Qualified Code(s): S72.492D - Other 

fracture of lower end of left femur, subsequent encounter for closed fracture 

with routine healing   





(3) Fall due to stumbling


SNOMED Code(s): 74201265


   Code(s): W01.0XXA - FALL SAME LEV FROM SLIP/TRIP W/O STRIKE AGAINST OBJECT, 

INIT   Status: Suspected   Priority: High   Current Visit: Yes   


Qualifiers: 


   Encounter type: subsequent encounter   Qualified Code(s): W01.0XXD - Fall on 

same level from slipping, tripping and stumbling without subsequent striking 

against object, subsequent encounter   





(4) Rotator cuff tear arthropathy of right shoulder


SNOMED Code(s): 33891681625771493


   Code(s): M75.101 - UNSP ROTATR-CUFF TEAR/RUPTR OF RIGHT SHOULDER, NOT TRAUMA

; M12.811 - OTH SPECIFIC ARTHROPATHIES, NEC, RIGHT SHOULDER   Status: Acute   

Priority: High   Current Visit: Yes   





(5) Hypomagnesemia


SNOMED Code(s): 088934512


   Code(s): E83.42 - HYPOMAGNESEMIA   Status: Acute   Priority: Medium   

Current Visit: No   Onset Date: 03/26/19   Annotation/Comment:: As above   





(6) Hyponatremia


SNOMED Code(s): 29865611


   Code(s): E87.1 - HYPO-OSMOLALITY AND HYPONATREMIA   Status: Acute   Current 

Visit: No   Annotation/Comment:: Sodium improved 132 although still low we'll 

send him home on a free salt diet and limited water intake to 1500 a day   





(7) Hypotension


SNOMED Code(s): 36025541


   Code(s): I95.9 - HYPOTENSION, UNSPECIFIED   Status: Acute   Current Visit: 

No   


Qualifiers: 


   Hypotension type: postprocedural hypotension   Qualified Code(s): I95.81 - 

Postprocedural hypotension   





(8) Anemia


SNOMED Code(s): 569010710


   Code(s): D64.9 - ANEMIA, UNSPECIFIED   Status: Chronic   Priority: Medium   

Current Visit: No   


Qualifiers: 


   Anemia type: unspecified type   Qualified Code(s): D64.9 - Anemia, 

unspecified   


Annotation/Comment:: Mild borderline anemia today with the current iron and 

vitamin B-12 supplements   





(9) COPD (chronic obstructive pulmonary disease)


SNOMED Code(s): 05723957


   Code(s): J44.9 - CHRONIC OBSTRUCTIVE PULMONARY DISEASE, UNSPECIFIED   Status

: Chronic   Priority: Medium   Current Visit: No   


Qualifiers: 


   COPD type: emphysema   Emphysema type: panlobular   Qualified Code(s): J43.1 

- Panlobular emphysema   


Annotation/Comment:: Stable by history with no recent history of fever, 

bronchitic type symptoms, etc.   





(10) Hypertension


SNOMED Code(s): 13433166


   Code(s): I10 - ESSENTIAL (PRIMARY) HYPERTENSION   Status: Chronic   Priority

: Medium   Current Visit: No   


Qualifiers: 


   Hypertension type: essential hypertension   Qualified Code(s): I10 - 

Essential (primary) hypertension   


Annotation/Comment:: Somewhat elevated in the emergency room initially, however 

stable prior to discharge without any additional medications. Continue to 

observe closely by regular provider.   





(11) Osteoarthritis


SNOMED Code(s): 526156302


   Code(s): M19.90 - UNSPECIFIED OSTEOARTHRITIS, UNSPECIFIED SITE   Status: 

Chronic   Priority: Medium   Current Visit: No   


Qualifiers: 


   Osteoarthritis location: multiple joints   Osteoarthritis type: primary   

Qualified Code(s): M15.0 - Primary generalized (osteo)arthritis   


Annotation/Comment:: Exacerbation of his chronic pain syndrome with mild CRP 

elevation with previous rheumatology consultation in [place] and use of biologic 

the past by his history. Note previous and current chronic narcotic use as 

above. Continue to observe closely by his regular provider.   





(12) Renal insufficiency


SNOMED Code(s): 780929905, 378773433


   Code(s): N28.9 - DISORDER OF KIDNEY AND URETER, UNSPECIFIED   Status: 

Chronic   Priority: Medium   Current Visit: No   Annotation/Comment:: Note 

previous history of renal insufficiency with normal renal function today. 

Continue to observe closely by his regular provider secondary to increased 

Lasix therapy today. Note patient was previously taking his Lasix on a daily 

rather than when necessary basis with no potassium supplement. Note current 

lisinopril HCTZ therapy. Follow-up blood work recommended as per discharge 

instructions.   





(13) Mixed anxiety and depressive disorder


SNOMED Code(s): 417924511


   Code(s): F41.8 - OTHER SPECIFIED ANXIETY DISORDERS   Status: Acute   Priority

: High   Current Visit: Yes   





(14) Ankylosing spondylitis


SNOMED Code(s): 6057106


   Code(s): M45.9 - ANKYLOSING SPONDYLITIS OF UNSPECIFIED SITES IN SPINE   

Status: Chronic   Current Visit: Yes   


Qualifiers: 


   Ankylosing spondylitis location: multiple sites in spine   Qualified Code(s)

: M45.0 - Ankylosing spondylitis of multiple sites in spine   





(15) Sleep apnea, obstructive


SNOMED Code(s): 66732717


   Code(s): G47.33 - OBSTRUCTIVE SLEEP APNEA (ADULT) (PEDIATRIC)   Status: 

Chronic   Priority: Medium   Current Visit: Yes   





(16) Obesities, morbid


SNOMED Code(s): 750782280


   Code(s): E66.01 - MORBID (SEVERE) OBESITY DUE TO EXCESS CALORIES   Status: 

Acute   Priority: High   Current Visit: Yes   





- Problem List Review


Problem List Initiated/Reviewed/Updated: Yes





- My Orders


Last 24 Hours: 


My Active Orders





08/05/19 05:11


CBC WITH AUTO DIFF [HEME] Routine 


CMP [COMPREHENSIVE METABOLIC PN,CMP] [CHEM] Routine 


MAGNESIUM [CHEM] Routine 





08/12/19 05:11


CBC WITH AUTO DIFF [HEME] Routine 


CMP [COMPREHENSIVE METABOLIC PN,CMP] [CHEM] Routine 














- Plan


Plan:: 





07/23/19


Jey Linares MD


See H&P. Admitted to swing bed status to continue PT-OT for distal left femur 

fracture S/P ORIF on 7/5/19 at [facility]. He also tore right rotator cuff a 

few days ago and needs PT to right shoulder.





08/02/19


Doing okay. Working hard in PT-OT. Incision healing well. Still no weight 

bearing on left leg allowed until he sees his orthopedic surgeon on 8/13/19. 

Check labs Monday. He is relatively immobile. Continue DVT prophylaxis until he 

sees his orthopedic surgeon.

## 2019-08-09 NOTE — PCM.PN
- General Info


Date of Service: 08/09/19


Functional Status: Reports: Pain Controlled, Tolerating Diet





- Review of Systems


General: Reports: No Symptoms


HEENT: Reports: No Symptoms


Pulmonary: Reports: No Symptoms


Cardiovascular: Reports: No Symptoms


Gastrointestinal: Reports: No Symptoms


Genitourinary: Reports: No Symptoms


Musculoskeletal: Reports: Back Pain, Leg Pain (left)


Skin: Reports: No Symptoms


Neurological: Reports: No Symptoms


Psychiatric: Reports: No Symptoms





- Patient Data


Vitals - Most Recent: 


 Last Vital Signs











Temp  98.4 F   08/09/19 20:00


 


Pulse  82   08/09/19 20:00


 


Resp  18   08/09/19 20:00


 


BP  141/83 H  08/09/19 20:00


 


Pulse Ox  95   08/09/19 20:00











Weight - Most Recent: 375 lb 8 oz


I&O - Last 24 Hours: 


 Intake & Output











 08/09/19 08/09/19 08/09/19





 06:59 14:59 22:59


 


Intake Total 480 720 


 


Output Total 900  


 


Balance -420 720 











Med Orders - Current: 


 Current Medications





Acetaminophen (Tylenol Extra Strength)  1,000 mg PO Q6H [facility]


   Last Admin: 08/09/19 18:31 Dose:  1,000 mg


Albuterol (Ventolin Hfa)  1 gm INH Q4H PRN


   PRN Reason: Dyspnea


   Last Admin: 08/08/19 22:36 Dose:  1 puff


Amitriptyline HCl (Elavil)  25 mg PO BEDTIME@2200 [facility]


   Last Admin: 08/09/19 22:34 Dose:  25 mg


Artificial Tears (Liquitears 1.4% Ophth Soln)  0 ml EYEBOTH Q1H PRN


   PRN Reason: Dry Eyes


   Last Admin: 08/07/19 07:39 Dose:  2 drop


Ascorbic Acid (Vitamin C)  500 mg PO DAILY@1700 [facility]


   Last Admin: 08/09/19 16:08 Dose:  500 mg


Calcium Carbonate (Oystcal-D 625 Mg-125 Units)  1 tab PO BID@1000,2200 [facility]


   Last Admin: 08/09/19 22:37 Dose:  1 tab


Cholecalciferol (Vitamin D3)  100 mcg PO DAILY [facility]


   Last Admin: 08/09/19 08:38 Dose:  100 mcg


Cyanocobalamin (Vitamin B12)  250 mcg PO DAILY [facility]


   Last Admin: 08/09/19 10:11 Dose:  250 mcg


Cyclobenzaprine HCl (Flexeril)  10 mg PO TID PRN


   PRN Reason: spasm,pain


   Last Admin: 08/09/19 22:34 Dose:  10 mg


Enoxaparin Sodium (Lovenox)  40 mg SUBCUT Q12HR [facility]


   Stop: 08/12/19 20:01


   Last Admin: 08/09/19 20:34 Dose:  40 mg


Escitalopram Oxalate (Lexapro)  20 mg PO DAILY [facility]


   Last Admin: 08/09/19 08:37 Dose:  20 mg


Ferrous Sulfate (Ferrous Sulfate)  325 mg PO DAILY@1700 [facility]


   Last Admin: 08/09/19 16:08 Dose:  325 mg


Folic Acid (Folic Acid)  1 mg PO DAILY [facility]


   Last Admin: 08/09/19 08:36 Dose:  1 mg


Magnesium Oxide (Magnesium Oxide)  400 mg PO Q12HR [facility]


   Last Admin: 08/09/19 20:34 Dose:  400 mg


Melatonin (Melatonin)  6 mg PO BEDTIME PRN


   PRN Reason: INSOMNIA


   Last Admin: 08/09/19 22:31 Dose:  6 mg


Multivitamins/Minerals/Vitamin C (Tab-A-Sudha)  1 tab PO DAILY [facility]


   Last Admin: 08/09/19 08:37 Dose:  1 tab


Oxycodone Ir 10mg (Tablets)  1 each PO Q4HR PRN


   PRN Reason: PAIN 4-10


   Last Admin: 08/09/19 20:34 Dose:  1 each


Oxycodone 20 Mg  ** (Own Med**)  0 each PO Q4H [facility]


   Last Admin: 08/09/19 22:31 Dose:  1 each


Omeprazole (Omeprazole)  20 mg PO DAILY PRN


   PRN Reason: Heartburn


   Last Admin: 07/31/19 08:25 Dose:  20 mg


Senna/Docusate Sodium (Senna Plus)  1 tab PO BID PRN


   PRN Reason: Constipation


Thiamine HCl (Vitamin B-1)  100 mg PO DAILY [facility]


   Last Admin: 08/09/19 08:37 Dose:  100 mg


Triamcinolone Acetonide (Triamcinolone Acetonide 0.1% Crm)  0 gm TOP BID PRN


   PRN Reason: Itching


   Last Admin: 08/07/19 14:41 Dose:  1 applic





Discontinued Medications





Acetaminophen (Tylenol Extra Strength)  1,000 mg PO TID [facility]


   Last Admin: 07/23/19 19:01 Dose:  1,000 mg


Amitriptyline HCl (Elavil)  25 mg PO BEDTIME [facility]


   Last Admin: 07/25/19 19:16 Dose:  25 mg


Calcium Carbonate (Oystcal-D 625 Mg-125 Units)  1 tab PO DAILY [facility]


Doxycycline Hyclate (Vibramycin)  100 mg PO BID [facility]


   Last Admin: 07/23/19 19:37 Dose:  Not Given


Doxycycline Hyclate (Vibramycin)  0 mg PO BID [facility]


   Last Admin: 07/23/19 19:55 Dose:  100 mg


Doxycycline Hyclate (Vibramycin)  0 mg PO BID [facility]


   Stop: 07/30/19 08:01


   Last Admin: 07/30/19 08:11 Dose:  100 mg


Enoxaparin Sodium (Lovenox)  40 mg SUBCUT Q12HR [facility]


   Stop: 08/03/19 20:01


   Last Admin: 07/26/19 07:50 Dose:  40 mg


Magnesium Oxide (Magnesium Oxide)  400 mg PO DAILY [facility]


   Last Admin: 07/31/19 08:27 Dose:  400 mg


Oxycodone Ir 20mg (Tablets)  1 each PO Q6H [facility]


   Last Admin: 07/23/19 18:50 Dose:  1 each


Oxycodone Ir 20mg (Tabs  **Own Med**)  1 each PO Q4H [facility]


   Last Admin: 07/26/19 17:25 Dose:  1 each


Omeprazole (Omeprazole)  20 mg PO DAILY [facility]


Polyethylene Glycol (Miralax)  17 gm PO DAILY [facility]


Senna/Docusate Sodium (Senna Plus)  2 tab PO BID [facility]


   Last Admin: 08/03/19 08:19 Dose:  2 tab


Senna/Docusate Sodium (Senna Plus)  1 tab PO BID [facility]


   Last Admin: 08/06/19 17:03 Dose:  Not Given


Triamcinolone Acetonide (Triamcinolone Acetonide 0.1% Crm)  0 gm TOP BID [facility]


   Last Admin: 08/03/19 08:39 Dose:  Not Given











- Exam


Quality Assessment: DVT Prophylaxis


General: Alert, Cooperative, No Acute Distress


HEENT: Mucous Membr. Moist/Pink


Neck: Trachea Midline, No JVD


Lungs: Clear to Auscultation, Normal Respiratory Effort


Cardiovascular: Regular Rate, Regular Rhythm


GI/Abdominal Exam: Soft, Non-Tender


 (Male) Exam: Deferred


Back Exam: Decreased Range of Motion


Extremities: Leg Pain (left thigh)


Skin: Warm, Dry, Intact


Wound/Incisions: Healing Well, No Drainage


Neurological: No New Focal Deficit


Psy/Mental Status: Alert, Normal Affect, Normal Mood





- Problem List & Annotations


(1) Orthopedic aftercare


SNOMED Code(s): 630866819


   Code(s): Z47.89 - ENCOUNTER FOR OTHER ORTHOPEDIC AFTERCARE   Status: Acute   

Priority: High   Current Visit: Yes   





(2) Femur fracture, left


SNOMED Code(s): 12871917


   Code(s): S72.92XA - UNSP FRACTURE OF LEFT FEMUR, INIT ENCNTR FOR CLOSED 

FRACTURE   Status: Acute   Priority: High   Current Visit: Yes   


Qualifiers: 


   Encounter type: subsequent encounter   Femur location: distal, unspecified 

portion   Fracture type: closed   Fracture morphology: other fracture   

Fracture healing: with routine healing   Qualified Code(s): S72.492D - Other 

fracture of lower end of left femur, subsequent encounter for closed fracture 

with routine healing   





(3) Fall due to stumbling


SNOMED Code(s): 38693416


   Code(s): W01.0XXA - FALL SAME LEV FROM SLIP/TRIP W/O STRIKE AGAINST OBJECT, 

INIT   Status: Suspected   Priority: High   Current Visit: Yes   


Qualifiers: 


   Encounter type: subsequent encounter   Qualified Code(s): W01.0XXD - Fall on 

same level from slipping, tripping and stumbling without subsequent striking 

against object, subsequent encounter   





(4) Rotator cuff tear arthropathy of right shoulder


SNOMED Code(s): 06921032551904324


   Code(s): M75.101 - UNSP ROTATR-CUFF TEAR/RUPTR OF RIGHT SHOULDER, NOT TRAUMA

; M12.811 - OTH SPECIFIC ARTHROPATHIES, NEC, RIGHT SHOULDER   Status: Acute   

Priority: High   Current Visit: Yes   





(5) Hypomagnesemia


SNOMED Code(s): 498784649


   Code(s): E83.42 - HYPOMAGNESEMIA   Status: Acute   Priority: Medium   

Current Visit: No   Onset Date: 03/26/19   Annotation/Comment:: As above   





(6) Hyponatremia


SNOMED Code(s): 01240424


   Code(s): E87.1 - HYPO-OSMOLALITY AND HYPONATREMIA   Status: Acute   Current 

Visit: No   Annotation/Comment:: Sodium improved 132 although still low we'll 

send him home on a free salt diet and limited water intake to 1500 a day   





(7) Hypotension


SNOMED Code(s): 44388991


   Code(s): I95.9 - HYPOTENSION, UNSPECIFIED   Status: Acute   Current Visit: 

No   


Qualifiers: 


   Hypotension type: postprocedural hypotension   Qualified Code(s): I95.81 - 

Postprocedural hypotension   





(8) Anemia


SNOMED Code(s): 445987033


   Code(s): D64.9 - ANEMIA, UNSPECIFIED   Status: Chronic   Priority: Medium   

Current Visit: No   


Qualifiers: 


   Anemia type: unspecified type   Qualified Code(s): D64.9 - Anemia, 

unspecified   


Annotation/Comment:: Mild borderline anemia today with the current iron and 

vitamin B-12 supplements   





(9) COPD (chronic obstructive pulmonary disease)


SNOMED Code(s): 79398239


   Code(s): J44.9 - CHRONIC OBSTRUCTIVE PULMONARY DISEASE, UNSPECIFIED   Status

: Chronic   Priority: Medium   Current Visit: No   


Qualifiers: 


   COPD type: emphysema   Emphysema type: panlobular   Qualified Code(s): J43.1 

- Panlobular emphysema   


Annotation/Comment:: Stable by history with no recent history of fever, 

bronchitic type symptoms, etc.   





(10) Hypertension


SNOMED Code(s): 19471151


   Code(s): I10 - ESSENTIAL (PRIMARY) HYPERTENSION   Status: Chronic   Priority

: Medium   Current Visit: No   


Qualifiers: 


   Hypertension type: essential hypertension   Qualified Code(s): I10 - 

Essential (primary) hypertension   


Annotation/Comment:: Somewhat elevated in the emergency room initially, however 

stable prior to discharge without any additional medications. Continue to 

observe closely by regular provider.   





(11) Osteoarthritis


SNOMED Code(s): 637334582


   Code(s): M19.90 - UNSPECIFIED OSTEOARTHRITIS, UNSPECIFIED SITE   Status: 

Chronic   Priority: Medium   Current Visit: No   


Qualifiers: 


   Osteoarthritis location: multiple joints   Osteoarthritis type: primary   

Qualified Code(s): M15.0 - Primary generalized (osteo)arthritis   


Annotation/Comment:: Exacerbation of his chronic pain syndrome with mild CRP 

elevation with previous rheumatology consultation in [place] and use of biologic 

the past by his history. Note previous and current chronic narcotic use as 

above. Continue to observe closely by his regular provider.   





(12) Renal insufficiency


SNOMED Code(s): 527049980, 321399893


   Code(s): N28.9 - DISORDER OF KIDNEY AND URETER, UNSPECIFIED   Status: 

Chronic   Priority: Medium   Current Visit: No   Annotation/Comment:: Note 

previous history of renal insufficiency with normal renal function today. 

Continue to observe closely by his regular provider secondary to increased 

Lasix therapy today. Note patient was previously taking his Lasix on a daily 

rather than when necessary basis with no potassium supplement. Note current 

lisinopril HCTZ therapy. Follow-up blood work recommended as per discharge 

instructions.   





(13) Mixed anxiety and depressive disorder


SNOMED Code(s): 472651865


   Code(s): F41.8 - OTHER SPECIFIED ANXIETY DISORDERS   Status: Acute   Priority

: High   Current Visit: Yes   





(14) Ankylosing spondylitis


SNOMED Code(s): 1368206


   Code(s): M45.9 - ANKYLOSING SPONDYLITIS OF UNSPECIFIED SITES IN SPINE   

Status: Chronic   Current Visit: Yes   


Qualifiers: 


   Ankylosing spondylitis location: multiple sites in spine   Qualified Code(s)

: M45.0 - Ankylosing spondylitis of multiple sites in spine   





(15) Sleep apnea, obstructive


SNOMED Code(s): 99036039


   Code(s): G47.33 - OBSTRUCTIVE SLEEP APNEA (ADULT) (PEDIATRIC)   Status: 

Chronic   Priority: Medium   Current Visit: Yes   





(16) Obesities, morbid


SNOMED Code(s): 968776122


   Code(s): E66.01 - MORBID (SEVERE) OBESITY DUE TO EXCESS CALORIES   Status: 

Acute   Priority: High   Current Visit: Yes   





- Problem List Review


Problem List Initiated/Reviewed/Updated: Yes





- My Orders


Last 24 Hours: 


My Active Orders





08/12/19 05:11


CBC WITH AUTO DIFF [HEME] Routine 


CMP [COMPREHENSIVE METABOLIC PN,CMP] [CHEM] Routine 














- Plan


Plan:: 





07/23/19


Jey Linares MD


See H&P. Admitted to swing bed status to continue PT-OT for distal left femur 

fracture S/P ORIF on 7/5/19 at [facility]. He also tore right rotator cuff a 

few days ago and needs PT to right shoulder.





08/02/19


Doing okay. Working hard in PT-OT. Incision healing well. Still no weight 

bearing on left leg allowed until he sees his orthopedic surgeon on 8/13/19. 

Check labs Monday. He is relatively immobile. Continue DVT prophylaxis until he 

sees his orthopedic surgeon.





08/09/19


Jey Linares MD


Continues to work in PT-OT. Labs reviewed. He has appointment with his ortho 

next week.

## 2019-08-15 NOTE — PCM.PN
- General Info


Date of Service: 08/15/19


Admission Dx/Problem (Free Text): 


 Admission Diagnosis/Problem





Admission Diagnosis/Problem      Fall





49 yowm mechanical fall (tripped on carpet) at friend's house sustained the 

distal spiral left femur fracture. He was hospitalized at [facility]. He had 

acute blood loss anemia,and significant hyponatremia (Na 115). Surgery was 

postponed until medical therapy to correct the hyponatremia. He went to the OR 

for ORIF of the left distal femur on 07/05/19. He had post-op anemia requiring 

blood transfusion. He had significant pain management issues with consult with 

pain doctor. He also has chronic pain. He was transferred to [facility] for 

continuation of pain control, for electrolyte management and for wound care. He 

did require a wound vac post op but incision is well healed at this time with 

staples out today.


Functional Status: Reports: Pain Controlled, Tolerating Diet





- Review of Systems


General: Reports: Weakness


HEENT: Reports: No Symptoms


Pulmonary: Reports: No Symptoms


Cardiovascular: Reports: No Symptoms


Gastrointestinal: Reports: No Symptoms


Genitourinary: Reports: No Symptoms


Musculoskeletal: Reports: Other (left leg lump noted to inner thigh, tender to 

touch and itches)


Skin: Reports: No Symptoms


Neurological: Reports: No Symptoms


Psychiatric: Reports: No Symptoms





- Patient Data


Vitals - Most Recent: 


 Last Vital Signs











Temp  98.7 F   08/14/19 19:47


 


Pulse  65   08/15/19 08:00


 


Resp  18   08/14/19 19:47


 


BP  110/78   08/15/19 08:00


 


Pulse Ox  96   08/14/19 19:47











Weight - Most Recent: 379 lb


I&O - Last 24 Hours: 


 Intake & Output











 08/15/19 08/15/19 08/15/19





 06:59 14:59 22:59


 


Intake Total  400 


 


Output Total 1300 1100 500


 


Balance -1300 -700 -500











Med Orders - Current: 


 Current Medications





Acetaminophen (Tylenol Extra Strength)  1,000 mg PO Q6H [facility]


   Last Admin: 08/15/19 17:22 Dose:  1,000 mg


Albuterol (Ventolin Hfa)  1 gm INH Q4H PRN


   PRN Reason: Dyspnea


   Last Admin: 08/15/19 14:30 Dose:  1 puff


Amitriptyline HCl (Elavil)  25 mg PO BEDTIME@2200 [facility]


   Last Admin: 08/14/19 21:17 Dose:  25 mg


Artificial Tears (Liquitears 1.4% Ophth Soln)  0 ml EYEBOTH Q1H PRN


   PRN Reason: Dry Eyes


   Last Admin: 08/10/19 20:56 Dose:  1 drop


Ascorbic Acid (Vitamin C)  500 mg PO DAILY@1700 [facility]


   Last Admin: 08/15/19 17:22 Dose:  500 mg


Calcium Carbonate (Oystcal-D 625 Mg-125 Units)  1 tab PO BID@1000,2200 [facility]


   Last Admin: 08/15/19 11:14 Dose:  1 tab


Cholecalciferol (Vitamin D3)  100 mcg PO DAILY [facility]


   Last Admin: 08/15/19 08:06 Dose:  100 mcg


Cyanocobalamin (Vitamin B12)  250 mcg PO DAILY [facility]


   Last Admin: 08/15/19 08:06 Dose:  250 mcg


Cyclobenzaprine HCl (Flexeril)  10 mg PO TID PRN


   PRN Reason: spasm,pain


   Last Admin: 08/15/19 11:15 Dose:  10 mg


Escitalopram Oxalate (Lexapro)  20 mg PO DAILY [facility]


   Last Admin: 08/15/19 08:07 Dose:  20 mg


Ferrous Sulfate (Ferrous Sulfate)  325 mg PO DAILY@1700 [facility]


   Last Admin: 08/15/19 17:24 Dose:  325 mg


Folic Acid (Folic Acid)  1 mg PO DAILY [facility]


   Last Admin: 08/15/19 08:06 Dose:  1 mg


Magnesium Oxide (Magnesium Oxide)  400 mg PO Q12HR [facility]


   Last Admin: 08/15/19 08:06 Dose:  400 mg


Melatonin (Melatonin)  6 mg PO BEDTIME PRN


   PRN Reason: INSOMNIA


   Last Admin: 08/14/19 21:16 Dose:  6 mg


Multivitamins/Minerals/Vitamin C (Tab-A-Sudha)  1 tab PO DAILY [facility]


   Last Admin: 08/15/19 08:06 Dose:  1 tab


Oxycodone Ir 10mg (Tablets)  1 each PO Q4HR PRN


   PRN Reason: PAIN 4-10


   Last Admin: 08/13/19 16:53 Dose:  1 each


Oxycodone 20 Mg  ** (Own Med**)  0 each PO Q4H [facility]


   Last Admin: 08/15/19 17:23 Dose:  1 each


Omeprazole (Omeprazole)  20 mg PO DAILY PRN


   PRN Reason: Heartburn


   Last Admin: 07/31/19 08:25 Dose:  20 mg


Senna/Docusate Sodium (Senna Plus)  1 tab PO BID PRN


   PRN Reason: Constipation


Thiamine HCl (Vitamin B-1)  100 mg PO DAILY [facility]


   Last Admin: 08/15/19 08:06 Dose:  100 mg


Triamcinolone Acetonide (Triamcinolone Acetonide 0.1% Crm)  0 gm TOP BID PRN


   PRN Reason: Itching


   Last Admin: 08/07/19 14:41 Dose:  1 applic





Discontinued Medications





Acetaminophen (Tylenol Extra Strength)  1,000 mg PO TID [facility]


   Last Admin: 07/23/19 19:01 Dose:  1,000 mg


Amitriptyline HCl (Elavil)  25 mg PO BEDTIME [facility]


   Last Admin: 07/25/19 19:16 Dose:  25 mg


Calcium Carbonate (Oystcal-D 625 Mg-125 Units)  1 tab PO DAILY [facility]


Doxycycline Hyclate (Vibramycin)  100 mg PO BID [facility]


   Last Admin: 07/23/19 19:37 Dose:  Not Given


Doxycycline Hyclate (Vibramycin)  0 mg PO BID [facility]


   Last Admin: 07/23/19 19:55 Dose:  100 mg


Doxycycline Hyclate (Vibramycin)  0 mg PO BID [facility]


   Stop: 07/30/19 08:01


   Last Admin: 07/30/19 08:11 Dose:  100 mg


Enoxaparin Sodium (Lovenox)  40 mg SUBCUT Q12HR [facility]


   Stop: 08/03/19 20:01


   Last Admin: 07/26/19 07:50 Dose:  40 mg


Enoxaparin Sodium (Lovenox)  40 mg SUBCUT Q12HR [facility]


   Stop: 08/12/19 20:01


   Last Admin: 08/12/19 19:23 Dose:  40 mg


Magnesium Oxide (Magnesium Oxide)  400 mg PO DAILY [facility]


   Last Admin: 07/31/19 08:27 Dose:  400 mg


Oxycodone Ir 20mg (Tablets)  1 each PO Q6H [facility]


   Last Admin: 07/23/19 18:50 Dose:  1 each


Oxycodone Ir 20mg (Tabs  **Own Med**)  1 each PO Q4H [facility]


   Last Admin: 07/26/19 17:25 Dose:  1 each


Omeprazole (Omeprazole)  20 mg PO DAILY [facility]


Polyethylene Glycol (Miralax)  17 gm PO DAILY [facility]


Senna/Docusate Sodium (Senna Plus)  2 tab PO BID [facility]


   Last Admin: 08/03/19 08:19 Dose:  2 tab


Senna/Docusate Sodium (Senna Plus)  1 tab PO BID [facility]


   Last Admin: 08/06/19 17:03 Dose:  Not Given


Triamcinolone Acetonide (Triamcinolone Acetonide 0.1% Crm)  0 gm TOP BID [facility]


   Last Admin: 08/03/19 08:39 Dose:  Not Given











- Exam


General: Alert, Oriented, Cooperative, No Acute Distress


HEENT: Mucous Membr. Moist/Pink


Neck: Supple, Trachea Midline, No JVD


Lungs: Clear to Auscultation, Normal Respiratory Effort


Cardiovascular: Regular Rate, Regular Rhythm


GI/Abdominal Exam: Normal Bowel Sounds, Soft, Non-Tender


Back Exam: Normal Inspection, Full Range of Motion


Extremities: Normal Capillary Refill, Other (palpable lump to inner left thigh, 

tender to touch, measures about 4cm in length and 4cm in diameter, 1+pedal 

edema to left LE and trace pedal edema to RLE)


Peripheral Pulses: 2+: Posterior Tibial (L), Posterior Tibial (R), Dorsalis 

Pedis (L), Dorsalis Pedis (R)


Skin: Warm, Dry, Intact


Wound/Incisions: Healing Well, No Drainage


Neurological: No New Focal Deficit


Psy/Mental Status: Alert, Normal Affect, Normal Mood





- Problem List Review


Problem List Initiated/Reviewed/Updated: Yes





- Plan


Plan:: 





07/23/19


Jey Linares MD


See H&P. Admitted to swing bed status to continue PT-OT for distal left femur 

fracture S/P ORIF on 7/5/19 at [facility]. He also tore right rotator cuff a 

few days ago and needs PT to right shoulder.





08/02/19


Doing okay. Working hard in PT-OT. Incision healing well. Still no weight 

bearing on left leg allowed until he sees his orthopedic surgeon on 8/13/19. 

Check labs Monday. He is relatively immobile. Continue DVT prophylaxis until he 

sees his orthopedic surgeon.





08/09/19


Jey Linares MD


Continues to work in PT-OT. Labs reviewed. He has appointment with his ortho 

next week.





8/15/2019


Patient seen to evaluate a lump to left medial thigh, tender and itches per the 

patient. Patient recently had left femur fracture s/p ORIF on 7/5/2019 at 

[place]. Patient saw the orthopedic surgeon on 8/13/2019 and showed him the lump

, was told to monitor. Patient denies any shortness of breath or chest pain. 

Some increased edema noted to the LLE, RLE also noted to have some edema. 

Patient was told by the surgeon that the lump is probably a hematoma from the 

surgery. Lovenox was recently stopped on 8/13/2019. Will continue to monitor 

daily, if any change is noted then the patient may need an ultrasound. Patient 

verbalized understanding and didnt have any more questions.


Yamileth Knight,CNP

## 2019-08-21 NOTE — PCM.PN
- General Info


Date of Service: 08/21/19


Admission Dx/Problem (Free Text): 


 Admission Diagnosis/Problem





Admission Diagnosis/Problem      Fall





49 yowm mechanical fall (tripped on carpet) at friend's house sustained the 

distal spiral left femur fracture. He was hospitalized at [facility]. He had 

acute blood loss anemia,and significant hyponatremia (Na 115). Surgery was 

postponed until medical therapy to correct the hyponatremia. He went to the OR 

for ORIF of the left distal femur on 07/05/19. He had post-op anemia requiring 

blood transfusion. He had significant pain management issues with consult with 

pain doctor. He also has chronic pain. He was transferred to [facility] for 

continuation of pain control, for electrolyte management and for wound care. He 

did require a wound vac post op but incision is well healed at this time with 

staples out today.


Functional Status: Reports: New Symptoms (pain shoulders bilateral)





- Review of Systems


General: Reports: No Symptoms


HEENT: Reports: No Symptoms


Pulmonary: Reports: No Symptoms


Cardiovascular: Reports: No Symptoms


Gastrointestinal: Reports: No Symptoms


Genitourinary: Reports: No Symptoms


Musculoskeletal: Reports: Neck Pain, Shoulder Pain, Back Pain, Leg Pain


Skin: Reports: No Symptoms


Neurological: Reports: No Symptoms


Psychiatric: Reports: No Symptoms





- Patient Data


Vitals - Most Recent: 


 Last Vital Signs











Temp  9707 F H  08/21/19 08:00


 


Pulse  72   08/21/19 08:00


 


Resp  18   08/21/19 08:00


 


BP  113/59 L  08/21/19 08:00


 


Pulse Ox  97   08/20/19 20:00











Weight - Most Recent: 381 lb 6.395 oz


I&O - Last 24 Hours: 


 Intake & Output











 08/20/19 08/21/19 08/21/19





 22:59 06:59 14:59


 


Intake Total   240


 


Output Total  900 


 


Balance  -900 240











Lab Results Last 24 Hours: 


 Laboratory Results - last 24 hr











  08/20/19 Range/Units





  07:05 


 


Magnesium  1.8  (1.8-2.4)  mg/dL











Med Orders - Current: 


 Current Medications





Acetaminophen (Tylenol Extra Strength)  1,000 mg PO Q6H TAISHA


   Last Admin: 08/21/19 05:02 Dose:  1,000 mg


Albuterol (Ventolin Hfa)  1 gm INH Q4H PRN


   PRN Reason: Dyspnea


   Last Admin: 08/21/19 08:12 Dose:  1 puff


Amitriptyline HCl (Elavil)  25 mg PO BEDTIME@2200 [facility]


   Last Admin: 08/20/19 23:03 Dose:  25 mg


Artificial Tears (Liquitears 1.4% Ophth Soln)  0 ml EYEBOTH Q1H PRN


   PRN Reason: Dry Eyes


   Last Admin: 08/18/19 10:36 Dose:  2 drop


Ascorbic Acid (Vitamin C)  500 mg PO DAILY@1700 [facility]


   Last Admin: 08/20/19 17:26 Dose:  500 mg


Aspirin (Ecotrin)  325 mg PO BID [facility]


   Last Admin: 08/21/19 07:34 Dose:  325 mg


Calcium Carbonate (Oystcal-D 625 Mg-125 Units)  1 tab PO BID@1000,2200 [facility]


   Last Admin: 08/21/19 10:40 Dose:  1 tab


Cholecalciferol (Vitamin D3)  100 mcg PO DAILY [facility]


   Last Admin: 08/21/19 07:33 Dose:  100 mcg


Cyanocobalamin (Vitamin B12)  250 mcg PO DAILY [facility]


   Last Admin: 08/21/19 07:34 Dose:  250 mcg


Cyclobenzaprine HCl (Flexeril)  10 mg PO TID PRN


   PRN Reason: spasm,pain


   Last Admin: 08/21/19 05:01 Dose:  10 mg


Escitalopram Oxalate (Lexapro)  20 mg PO DAILY [facility]


   Last Admin: 08/21/19 07:35 Dose:  20 mg


Ferrous Sulfate (Ferrous Sulfate)  325 mg PO DAILY@1700 [facility]


   Last Admin: 08/20/19 17:26 Dose:  325 mg


Folic Acid (Folic Acid)  1 mg PO DAILY [facility]


   Last Admin: 08/21/19 07:34 Dose:  1 mg


Magnesium Oxide (Magnesium Oxide)  400 mg PO Q12HR [facility]


   Last Admin: 08/21/19 07:33 Dose:  400 mg


Melatonin (Melatonin)  6 mg PO BEDTIME PRN


   PRN Reason: INSOMNIA


   Last Admin: 08/20/19 23:03 Dose:  6 mg


Multivitamins/Minerals/Vitamin C (Tab-A-Sudha)  1 tab PO DAILY [facility]


   Last Admin: 08/21/19 07:34 Dose:  1 tab


Oxycodone Ir 10mg (Tablets)  1 each PO Q4HR PRN


   PRN Reason: PAIN 4-10


   Last Admin: 08/20/19 20:14 Dose:  1 each


Oxycodone 20 Mg  ** (Own Med**)  0 each PO Q4H [facility]


   Last Admin: 08/21/19 10:40 Dose:  1 each


Omeprazole (Omeprazole)  20 mg PO DAILY PRN


   PRN Reason: Heartburn


   Last Admin: 07/31/19 08:25 Dose:  20 mg


Senna/Docusate Sodium (Senna Plus)  1 tab PO BID PRN


   PRN Reason: Constipation


   Last Admin: 08/15/19 22:18 Dose:  1 tab


Thiamine HCl (Vitamin B-1)  100 mg PO DAILY [facility]


   Last Admin: 08/21/19 07:33 Dose:  100 mg


Triamcinolone Acetonide (Triamcinolone Acetonide 0.1% Crm)  0 gm TOP BID PRN


   PRN Reason: Itching


   Last Admin: 08/17/19 19:33 Dose:  1 applic





Discontinued Medications





Acetaminophen (Tylenol Extra Strength)  1,000 mg PO TID [facility]


   Last Admin: 07/23/19 19:01 Dose:  1,000 mg


Amitriptyline HCl (Elavil)  25 mg PO BEDTIME [facility]


   Last Admin: 07/25/19 19:16 Dose:  25 mg


Calcium Carbonate (Oystcal-D 625 Mg-125 Units)  1 tab PO DAILY [facility]


Doxycycline Hyclate (Vibramycin)  100 mg PO BID [facility]


   Last Admin: 07/23/19 19:37 Dose:  Not Given


Doxycycline Hyclate (Vibramycin)  0 mg PO BID [facility]


   Last Admin: 07/23/19 19:55 Dose:  100 mg


Doxycycline Hyclate (Vibramycin)  0 mg PO BID [facility]


   Stop: 07/30/19 08:01


   Last Admin: 07/30/19 08:11 Dose:  100 mg


Enoxaparin Sodium (Lovenox)  40 mg SUBCUT Q12HR [facility]


   Stop: 08/03/19 20:01


   Last Admin: 07/26/19 07:50 Dose:  40 mg


Enoxaparin Sodium (Lovenox)  40 mg SUBCUT Q12HR [facility]


   Stop: 08/12/19 20:01


   Last Admin: 08/12/19 19:23 Dose:  40 mg


Magnesium Oxide (Magnesium Oxide)  400 mg PO DAILY [facility]


   Last Admin: 07/31/19 08:27 Dose:  400 mg


Oxycodone Ir 20mg (Tablets)  1 each PO Q6H [facility]


   Last Admin: 07/23/19 18:50 Dose:  1 each


Oxycodone Ir 20mg (Tabs  **Own Med**)  1 each PO Q4H [facility]


   Last Admin: 07/26/19 17:25 Dose:  1 each


Omeprazole (Omeprazole)  20 mg PO DAILY [facility]


Polyethylene Glycol (Miralax)  17 gm PO DAILY [facility]


Senna/Docusate Sodium (Senna Plus)  2 tab PO BID [facility]


   Last Admin: 08/03/19 08:19 Dose:  2 tab


Senna/Docusate Sodium (Senna Plus)  1 tab PO BID [facility]


   Last Admin: 08/06/19 17:03 Dose:  Not Given


Triamcinolone Acetonide (Triamcinolone Acetonide 0.1% Crm)  0 gm TOP BID [facility]


   Last Admin: 08/03/19 08:39 Dose:  Not Given











- Exam


General: Alert, Cooperative, No Acute Distress


HEENT: Mucous Membr. Moist/Pink


Neck: Trachea Midline, No JVD


Lungs: Clear to Auscultation, Normal Respiratory Effort


Cardiovascular: Regular Rate, Regular Rhythm


GI/Abdominal Exam: Soft, Non-Tender


 (Male) Exam: Other (large hemorrhoid prolapsed)


Back Exam: Decreased Range of Motion


Extremities: Other (induration left lower inner thigh)


Skin: Warm, Dry, Intact


Wound/Incisions: Healing Well, No Drainage


Neurological: No New Focal Deficit


Psy/Mental Status: Alert, Normal Affect, Normal Mood





- Problem List & Annotations


(1) Orthopedic aftercare


SNOMED Code(s): 509324957


   Code(s): Z47.89 - ENCOUNTER FOR OTHER ORTHOPEDIC AFTERCARE   Status: Acute   

Priority: High   Current Visit: Yes   





(2) Femur fracture, left


SNOMED Code(s): 51726209


   Code(s): S72.92XA - UNSP FRACTURE OF LEFT FEMUR, INIT ENCNTR FOR CLOSED 

FRACTURE   Status: Acute   Priority: High   Current Visit: Yes   


Qualifiers: 


   Encounter type: subsequent encounter   Femur location: distal, unspecified 

portion   Fracture type: closed   Fracture morphology: other fracture   

Fracture healing: with routine healing   Qualified Code(s): S72.492D - Other 

fracture of lower end of left femur, subsequent encounter for closed fracture 

with routine healing   





(3) Fall due to stumbling


SNOMED Code(s): 29398613


   Code(s): W01.0XXA - FALL SAME LEV FROM SLIP/TRIP W/O STRIKE AGAINST OBJECT, 

INIT   Status: Suspected   Priority: High   Current Visit: Yes   


Qualifiers: 


   Encounter type: subsequent encounter   Qualified Code(s): W01.0XXD - Fall on 

same level from slipping, tripping and stumbling without subsequent striking 

against object, subsequent encounter   





(4) Rotator cuff tear arthropathy of right shoulder


SNOMED Code(s): 02694265816173548


   Code(s): M75.101 - UNSP ROTATR-CUFF TEAR/RUPTR OF RIGHT SHOULDER, NOT TRAUMA

; M12.811 - OTH SPECIFIC ARTHROPATHIES, NEC, RIGHT SHOULDER   Status: Acute   

Priority: High   Current Visit: Yes   





(5) Hypomagnesemia


SNOMED Code(s): 264766958


   Code(s): E83.42 - HYPOMAGNESEMIA   Status: Acute   Priority: Medium   

Current Visit: No   Onset Date: 03/26/19   Annotation/Comment:: As above   





(6) Hyponatremia


SNOMED Code(s): 41072697


   Code(s): E87.1 - HYPO-OSMOLALITY AND HYPONATREMIA   Status: Acute   Current 

Visit: No   Annotation/Comment:: Sodium improved 132 although still low we'll 

send him home on a free salt diet and limited water intake to 1500 a day   





(7) Hypotension


SNOMED Code(s): 61625643


   Code(s): I95.9 - HYPOTENSION, UNSPECIFIED   Status: Acute   Current Visit: 

No   


Qualifiers: 


   Hypotension type: postprocedural hypotension   Qualified Code(s): I95.81 - 

Postprocedural hypotension   





(8) Anemia


SNOMED Code(s): 772242262


   Code(s): D64.9 - ANEMIA, UNSPECIFIED   Status: Chronic   Priority: Medium   

Current Visit: No   


Qualifiers: 


   Anemia type: unspecified type   Qualified Code(s): D64.9 - Anemia, 

unspecified   


Annotation/Comment:: Mild borderline anemia today with the current iron and 

vitamin B-12 supplements   





(9) COPD (chronic obstructive pulmonary disease)


SNOMED Code(s): 37841379


   Code(s): J44.9 - CHRONIC OBSTRUCTIVE PULMONARY DISEASE, UNSPECIFIED   Status

: Chronic   Priority: Medium   Current Visit: No   


Qualifiers: 


   COPD type: emphysema   Emphysema type: panlobular   Qualified Code(s): J43.1 

- Panlobular emphysema   


Annotation/Comment:: Stable by history with no recent history of fever, 

bronchitic type symptoms, etc.   





(10) Hypertension


SNOMED Code(s): 84557895


   Code(s): I10 - ESSENTIAL (PRIMARY) HYPERTENSION   Status: Chronic   Priority

: Medium   Current Visit: No   


Qualifiers: 


   Hypertension type: essential hypertension   Qualified Code(s): I10 - 

Essential (primary) hypertension   


Annotation/Comment:: Somewhat elevated in the emergency room initially, however 

stable prior to discharge without any additional medications. Continue to 

observe closely by regular provider.   





(11) Osteoarthritis


SNOMED Code(s): 308571707


   Code(s): M19.90 - UNSPECIFIED OSTEOARTHRITIS, UNSPECIFIED SITE   Status: 

Chronic   Priority: Medium   Current Visit: No   


Qualifiers: 


   Osteoarthritis location: multiple joints   Osteoarthritis type: primary   

Qualified Code(s): M15.0 - Primary generalized (osteo)arthritis   


Annotation/Comment:: Exacerbation of his chronic pain syndrome with mild CRP 

elevation with previous rheumatology consultation in [place] and use of biologic 

the past by his history. Note previous and current chronic narcotic use as 

above. Continue to observe closely by his regular provider.   





(12) Renal insufficiency


SNOMED Code(s): 284084416, 241325661


   Code(s): N28.9 - DISORDER OF KIDNEY AND URETER, UNSPECIFIED   Status: 

Chronic   Priority: Medium   Current Visit: No   Annotation/Comment:: Note 

previous history of renal insufficiency with normal renal function today. 

Continue to observe closely by his regular provider secondary to increased 

Lasix therapy today. Note patient was previously taking his Lasix on a daily 

rather than when necessary basis with no potassium supplement. Note current 

lisinopril HCTZ therapy. Follow-up blood work recommended as per discharge 

instructions.   





(13) Mixed anxiety and depressive disorder


SNOMED Code(s): 931245617


   Code(s): F41.8 - OTHER SPECIFIED ANXIETY DISORDERS   Status: Acute   Priority

: High   Current Visit: Yes   





(14) Ankylosing spondylitis


SNOMED Code(s): 9877665


   Code(s): M45.9 - ANKYLOSING SPONDYLITIS OF UNSPECIFIED SITES IN SPINE   

Status: Chronic   Current Visit: Yes   


Qualifiers: 


   Ankylosing spondylitis location: multiple sites in spine   Qualified Code(s)

: M45.0 - Ankylosing spondylitis of multiple sites in spine   





(15) Sleep apnea, obstructive


SNOMED Code(s): 10223657


   Code(s): G47.33 - OBSTRUCTIVE SLEEP APNEA (ADULT) (PEDIATRIC)   Status: 

Chronic   Priority: Medium   Current Visit: Yes   





(16) Obesities, morbid


SNOMED Code(s): 238442663


   Code(s): E66.01 - MORBID (SEVERE) OBESITY DUE TO EXCESS CALORIES   Status: 

Acute   Priority: High   Current Visit: Yes   





(17) Acute hemorrhoid


SNOMED Code(s): 68805471, 07329180


   Code(s): K64.9 - UNSPECIFIED HEMORRHOIDS   Status: Acute   Current Visit: 

Yes   





- Problem List Review


Problem List Initiated/Reviewed/Updated: Yes





- My Orders


Last 24 Hours: 


My Active Orders





08/21/19 12:00


Notify Provider Consults [RC] ASDIRECTED 


Consult to Physician [CONS] Routine 














- Plan


Plan:: 





07/23/19


Jey Linares MD


See H&P. Admitted to swing bed status to continue PT-OT for distal left femur 

fracture S/P ORIF on 7/5/19 at [facility]. He also tore right rotator cuff a 

few days ago and needs PT to right shoulder.





08/02/19


Doing okay. Working hard in PT-OT. Incision healing well. Still no weight 

bearing on left leg allowed until he sees his orthopedic surgeon on 8/13/19. 

Check labs Monday. He is relatively immobile. Continue DVT prophylaxis until he 

sees his orthopedic surgeon.





08/09/19


Jey Linares MD


Continues to work in PT-OT. Labs reviewed. He has appointment with his ortho 

next week.





8/15/2019


Patient seen to evaluate a lump to left medial thigh, tender and itches per the 

patient. Patient recently had left femur fracture s/p ORIF on 7/5/2019 at 

[place]. Patient saw the orthopedic surgeon on 8/13/2019 and showed him the lump

, was told to monitor. Patient denies any shortness of breath or chest pain. 

Some increased edema noted to the LLE, RLE also noted to have some edema. 

Patient was told by the surgeon that the lump is probably a hematoma from the 

surgery. Lovenox was recently stopped on 8/13/2019. Will continue to monitor 

daily, if any change is noted then the patient may need an ultrasound. Patient 

verbalized understanding and didnt have any more questions.


Yamileth Knight,CNP





08/21/19


Jey Linares MD


Discussed with him left thigh induration area, ultrasound results, lab results. 

Still no weight bearing on left leg. Shoulder pains bilateral. Large prolapsing 

hemrrhoid. Will consult surgeon to check hemrrhoid. Continue PT-OT.

## 2019-08-22 NOTE — PN
Date of Service:  08/22/2019

 

HISTORY:  This 48-year-old male is seen for evaluation of hemorrhoid problems.

The patient states that he has had some hemorrhoids protruding when he sits or

goes to the bathroom.  He has a history of hemorrhoid surgery in the past, one

in [place] and one in [place].  At this point, he denies any pain or bleeding.

 

PHYSICAL EXAMINATION:

Exam was carried out in the right lateral position.  Perianal area appears

normal.  There are no external hemorrhoids or prolapsing hemorrhoids today.  The

area is normal to inspection and nontender.

 

ASSESSMENT:  History of hemorrhoid problems.

 

PLAN:  No further treatment is necessary at this point.  It sounds as if he does

get a prolapsing hemorrhoid when he sits for prolonged periods of time.  He is

to avoid this and keep from getting constipated.  No surgical intervention is

necessary at this time.

 

MODL THOMAS J MOHS, MD

DD:  08/22/2019 12:32:06

DT:  08/22/2019 15:12:21

Job #:  [id number]

## 2019-08-29 NOTE — PCM.PN
- General Info


Date of Service: 08/29/19


Admission Dx/Problem (Free Text): 


 Admission Diagnosis/Problem





Admission Diagnosis/Problem      Fall





49 yowm mechanical fall (tripped on carpet) at friend's house sustained the 

distal spiral left femur fracture. He was hospitalized at [facility]. He had 

acute blood loss anemia,and significant hyponatremia (Na 115). Surgery was 

postponed until medical therapy to correct the hyponatremia. He went to the OR 

for ORIF of the left distal femur on 07/05/19. He had post-op anemia requiring 

blood transfusion. He had significant pain management issues with consult with 

pain doctor. He also has chronic pain. He was transferred to [facility] for 

continuation of pain control, for electrolyte management and for wound care. He 

did require a wound vac post op but incision is well healed at this time with 

staples out today.


Functional Status: Reports: Tolerating Diet, Other (increased pain left leg)





- Review of Systems


General: Reports: Other (he admits he has been putting weight on left leg 

although his orthopedic providers have not authorized him to bear any weight on 

the LLE and I agree with ortho)


HEENT: Reports: Headaches


Pulmonary: Reports: No Symptoms


Cardiovascular: Reports: No Symptoms


Gastrointestinal: Reports: No Symptoms


Genitourinary: Reports: No Symptoms


Musculoskeletal: Reports: Neck Pain, Shoulder Pain, Back Pain, Leg Pain (left)


Skin: Reports: No Symptoms


Neurological: Reports: No Symptoms


Psychiatric: Reports: No Symptoms





- Patient Data


Vitals - Most Recent: 


 Last Vital Signs











Temp  98.8 F   08/29/19 19:50


 


Pulse  86   08/29/19 19:50


 


Resp  17   08/29/19 19:50


 


BP  120/76   08/29/19 19:50


 


Pulse Ox  98   08/29/19 19:50











Weight - Most Recent: 379 lb 12.8 oz


I&O - Last 24 Hours: 


 Intake & Output











 08/29/19 08/29/19 08/29/19





 06:59 14:59 22:59


 


Intake Total  860 650


 


Output Total 900  


 


Balance -900 860 650











Lab Results Last 24 Hours: 


 Laboratory Results - last 24 hr











  08/29/19 08/29/19 Range/Units





  08:17 11:40 


 


POC Glucose  175 H  114 H  ()  mg/dl











Med Orders - Current: 


 Current Medications





Acetaminophen (Tylenol Extra Strength)  1,000 mg PO Q6H Count includes the [facility]


   Last Admin: 08/29/19 17:31 Dose:  1,000 mg


Albuterol (Ventolin Hfa)  1 gm INH Q4H PRN


   PRN Reason: Dyspnea


   Last Admin: 08/26/19 22:27 Dose:  1 puff


Amitriptyline HCl (Elavil)  25 mg PO BEDTIME@2200 Count includes the [facility]


   Last Admin: 08/28/19 22:59 Dose:  25 mg


Artificial Tears (Liquitears 1.4% Ophth Soln)  0 ml EYEBOTH Q1H PRN


   PRN Reason: Dry Eyes


   Last Admin: 08/18/19 10:36 Dose:  2 drop


Ascorbic Acid (Vitamin C)  500 mg PO DAILY@1700 Count includes the [facility]


   Last Admin: 08/29/19 17:31 Dose:  500 mg


Aspirin (Ecotrin)  325 mg PO BID Count includes the [facility]


   Last Admin: 08/29/19 17:31 Dose:  325 mg


Calcium Carbonate (Oystcal-D 625 Mg-125 Units)  1 tab PO BID@1000,2200 Count includes the [facility]


   Last Admin: 08/29/19 11:01 Dose:  1 tab


Cholecalciferol (Vitamin D3)  100 mcg PO DAILY Count includes the [facility]


   Last Admin: 08/29/19 07:07 Dose:  100 mcg


Cyanocobalamin (Vitamin B12)  250 mcg PO DAILY Count includes the [facility]


   Last Admin: 08/29/19 07:06 Dose:  250 mcg


Cyclobenzaprine HCl (Flexeril)  10 mg PO TID PRN


   PRN Reason: spasm,pain


   Last Admin: 08/29/19 14:17 Dose:  10 mg


Escitalopram Oxalate (Lexapro)  20 mg PO DAILY Count includes the [facility]


   Last Admin: 08/29/19 07:08 Dose:  20 mg


Ferrous Sulfate (Ferrous Sulfate)  325 mg PO DAILY@1700 Count includes the [facility]


   Last Admin: 08/29/19 17:31 Dose:  325 mg


Folic Acid (Folic Acid)  1 mg PO DAILY Count includes the [facility]


   Last Admin: 08/29/19 07:08 Dose:  1 mg


Magnesium Oxide (Magnesium Oxide)  400 mg PO Q12HR Count includes the [facility]


   Last Admin: 08/29/19 07:07 Dose:  400 mg


Melatonin (Melatonin)  6 mg PO BEDTIME PRN


   PRN Reason: INSOMNIA


   Last Admin: 08/28/19 23:02 Dose:  6 mg


Multivitamins/Minerals/Vitamin C (Tab-A-Sudha)  1 tab PO DAILY Count includes the [facility]


   Last Admin: 08/29/19 07:06 Dose:  1 tab


Oxycodone Ir 10mg (Tablets)  1 each PO Q4HR PRN


   PRN Reason: PAIN 4-10


   Last Admin: 08/29/19 16:10 Dose:  1 each


Oxycodone 20 Mg  ** (Own Med**)  0 each PO Q4H Count includes the [facility]


   Last Admin: 08/29/19 17:31 Dose:  1 each


Omeprazole (Omeprazole)  20 mg PO DAILY PRN


   PRN Reason: Heartburn


   Last Admin: 08/24/19 01:11 Dose:  20 mg


Senna/Docusate Sodium (Senna Plus)  1 tab PO BID PRN


   PRN Reason: Constipation


   Last Admin: 08/15/19 22:18 Dose:  1 tab


Thiamine HCl (Vitamin B-1)  100 mg PO DAILY Count includes the [facility]


   Last Admin: 08/29/19 07:06 Dose:  100 mg


Triamcinolone Acetonide (Triamcinolone Acetonide 0.1% Crm)  0 gm TOP BID PRN


   PRN Reason: Itching


   Last Admin: 08/28/19 23:02 Dose:  1 applic





Discontinued Medications





Acetaminophen (Tylenol Extra Strength)  1,000 mg PO TID Count includes the [facility]


   Last Admin: 07/23/19 19:01 Dose:  1,000 mg


Amitriptyline HCl (Elavil)  25 mg PO BEDTIME Count includes the [facility]


   Last Admin: 07/25/19 19:16 Dose:  25 mg


Calcium Carbonate (Oystcal-D 625 Mg-125 Units)  1 tab PO DAILY Count includes the [facility]


Doxycycline Hyclate (Vibramycin)  100 mg PO BID Count includes the [facility]


   Last Admin: 07/23/19 19:37 Dose:  Not Given


Doxycycline Hyclate (Vibramycin)  0 mg PO BID Count includes the [facility]


   Last Admin: 07/23/19 19:55 Dose:  100 mg


Doxycycline Hyclate (Vibramycin)  0 mg PO BID Count includes the [facility]


   Stop: 07/30/19 08:01


   Last Admin: 07/30/19 08:11 Dose:  100 mg


Enoxaparin Sodium (Lovenox)  40 mg SUBCUT Q12HR Count includes the [facility]


   Stop: 08/03/19 20:01


   Last Admin: 07/26/19 07:50 Dose:  40 mg


Enoxaparin Sodium (Lovenox)  40 mg SUBCUT Q12HR Count includes the [facility]


   Stop: 08/12/19 20:01


   Last Admin: 08/12/19 19:23 Dose:  40 mg


Magnesium Oxide (Magnesium Oxide)  400 mg PO DAILY Count includes the [facility]


   Last Admin: 07/31/19 08:27 Dose:  400 mg


Oxycodone Ir 20mg (Tablets)  1 each PO Q6H Count includes the [facility]


   Last Admin: 07/23/19 18:50 Dose:  1 each


Oxycodone Ir 20mg (Tabs  **Own Med**)  1 each PO Q4H Count includes the [facility]


   Last Admin: 07/26/19 17:25 Dose:  1 each


Omeprazole (Omeprazole)  20 mg PO DAILY Count includes the [facility]


Polyethylene Glycol (Miralax)  17 gm PO DAILY Count includes the [facility]


Senna/Docusate Sodium (Senna Plus)  2 tab PO BID Count includes the [facility]


   Last Admin: 08/03/19 08:19 Dose:  2 tab


Senna/Docusate Sodium (Senna Plus)  1 tab PO BID Count includes the [facility]


   Last Admin: 08/06/19 17:03 Dose:  Not Given


Triamcinolone Acetonide (Triamcinolone Acetonide 0.1% Crm)  0 gm TOP BID Count includes the [facility]


   Last Admin: 08/03/19 08:39 Dose:  Not Given











- Exam


Quality Assessment: DVT Prophylaxis (ecotrin)


General: Alert, Cooperative, No Acute Distress


HEENT: Mucous Membr. Moist/Pink


Neck: Trachea Midline, No JVD


Lungs: Clear to Auscultation, Normal Respiratory Effort


Cardiovascular: Regular Rate, Regular Rhythm


GI/Abdominal Exam: Soft, Non-Tender, No Distention


 (Male) Exam: Deferred


Back Exam: Normal Inspection, Decreased Range of Motion


Extremities: Pedal Edema


Skin: Warm, Dry, Intact


Wound/Incisions: Healing Well, No Drainage


Neurological: No New Focal Deficit


Psy/Mental Status: Alert, Normal Affect, Normal Mood





- Problem List & Annotations


(1) Orthopedic aftercare


SNOMED Code(s): 983535176


   Code(s): Z47.89 - ENCOUNTER FOR OTHER ORTHOPEDIC AFTERCARE   Status: Acute   

Priority: High   Current Visit: Yes   





(2) Femur fracture, left


SNOMED Code(s): 91068319


   Code(s): S72.92XA - UNSP FRACTURE OF LEFT FEMUR, INIT ENCNTR FOR CLOSED 

FRACTURE   Status: Acute   Priority: High   Current Visit: Yes   


Qualifiers: 


   Encounter type: subsequent encounter   Femur location: distal, unspecified 

portion   Fracture type: closed   Fracture morphology: other fracture   

Fracture healing: with routine healing   Qualified Code(s): S72.492D - Other 

fracture of lower end of left femur, subsequent encounter for closed fracture 

with routine healing   





(3) Fall due to stumbling


SNOMED Code(s): 22830498


   Code(s): W01.0XXA - FALL SAME LEV FROM SLIP/TRIP W/O STRIKE AGAINST OBJECT, 

INIT   Status: Suspected   Priority: High   Current Visit: Yes   


Qualifiers: 


   Encounter type: subsequent encounter   Qualified Code(s): W01.0XXD - Fall on 

same level from slipping, tripping and stumbling without subsequent striking 

against object, subsequent encounter   





(4) Rotator cuff tear arthropathy of right shoulder


SNOMED Code(s): 07277724218145875


   Code(s): M75.101 - UNSP ROTATR-CUFF TEAR/RUPTR OF RIGHT SHOULDER, NOT TRAUMA

; M12.811 - OTH SPECIFIC ARTHROPATHIES, NEC, RIGHT SHOULDER   Status: Acute   

Priority: High   Current Visit: Yes   





(5) Hypomagnesemia


SNOMED Code(s): 125258805


   Code(s): E83.42 - HYPOMAGNESEMIA   Status: Acute   Priority: Medium   

Current Visit: No   Onset Date: 03/26/19   Annotation/Comment:: As above   





(6) Hyponatremia


SNOMED Code(s): 08345542


   Code(s): E87.1 - HYPO-OSMOLALITY AND HYPONATREMIA   Status: Acute   Current 

Visit: No   Annotation/Comment:: Sodium improved 132 although still low we'll 

send him home on a free salt diet and limited water intake to 1500 a day   





(7) Hypotension


SNOMED Code(s): 33450649


   Code(s): I95.9 - HYPOTENSION, UNSPECIFIED   Status: Acute   Current Visit: 

No   


Qualifiers: 


   Hypotension type: postprocedural hypotension   Qualified Code(s): I95.81 - 

Postprocedural hypotension   





(8) Anemia


SNOMED Code(s): 202267975


   Code(s): D64.9 - ANEMIA, UNSPECIFIED   Status: Chronic   Priority: Medium   

Current Visit: No   


Qualifiers: 


   Anemia type: unspecified type   Qualified Code(s): D64.9 - Anemia, 

unspecified   


Annotation/Comment:: Mild borderline anemia today with the current iron and 

vitamin B-12 supplements   





(9) COPD (chronic obstructive pulmonary disease)


SNOMED Code(s): 63538022


   Code(s): J44.9 - CHRONIC OBSTRUCTIVE PULMONARY DISEASE, UNSPECIFIED   Status

: Chronic   Priority: Medium   Current Visit: No   


Qualifiers: 


   COPD type: emphysema   Emphysema type: panlobular   Qualified Code(s): J43.1 

- Panlobular emphysema   


Annotation/Comment:: Stable by history with no recent history of fever, 

bronchitic type symptoms, etc.   





(10) Hypertension


SNOMED Code(s): 56355553


   Code(s): I10 - ESSENTIAL (PRIMARY) HYPERTENSION   Status: Chronic   Priority

: Medium   Current Visit: No   


Qualifiers: 


   Hypertension type: essential hypertension   Qualified Code(s): I10 - 

Essential (primary) hypertension   


Annotation/Comment:: Somewhat elevated in the emergency room initially, however 

stable prior to discharge without any additional medications. Continue to 

observe closely by regular provider.   





(11) Osteoarthritis


SNOMED Code(s): 175954350


   Code(s): M19.90 - UNSPECIFIED OSTEOARTHRITIS, UNSPECIFIED SITE   Status: 

Chronic   Priority: Medium   Current Visit: No   


Qualifiers: 


   Osteoarthritis location: multiple joints   Osteoarthritis type: primary   

Qualified Code(s): M15.0 - Primary generalized (osteo)arthritis   


Annotation/Comment:: Exacerbation of his chronic pain syndrome with mild CRP 

elevation with previous rheumatology consultation in [place] and use of biologic 

the past by his history. Note previous and current chronic narcotic use as 

above. Continue to observe closely by his regular provider.   





(12) Renal insufficiency


SNOMED Code(s): 017554084, 897213234


   Code(s): N28.9 - DISORDER OF KIDNEY AND URETER, UNSPECIFIED   Status: 

Chronic   Priority: Medium   Current Visit: No   Annotation/Comment:: Note 

previous history of renal insufficiency with normal renal function today. 

Continue to observe closely by his regular provider secondary to increased 

Lasix therapy today. Note patient was previously taking his Lasix on a daily 

rather than when necessary basis with no potassium supplement. Note current 

lisinopril HCTZ therapy. Follow-up blood work recommended as per discharge 

instructions.   





(13) Mixed anxiety and depressive disorder


SNOMED Code(s): 450205283


   Code(s): F41.8 - OTHER SPECIFIED ANXIETY DISORDERS   Status: Acute   Priority

: High   Current Visit: Yes   





(14) Ankylosing spondylitis


SNOMED Code(s): 3611779


   Code(s): M45.9 - ANKYLOSING SPONDYLITIS OF UNSPECIFIED SITES IN SPINE   

Status: Chronic   Current Visit: Yes   


Qualifiers: 


   Ankylosing spondylitis location: multiple sites in spine   Qualified Code(s)

: M45.0 - Ankylosing spondylitis of multiple sites in spine   





(15) Sleep apnea, obstructive


SNOMED Code(s): 72891661


   Code(s): G47.33 - OBSTRUCTIVE SLEEP APNEA (ADULT) (PEDIATRIC)   Status: 

Chronic   Priority: Medium   Current Visit: Yes   





(16) Obesities, morbid


SNOMED Code(s): 560087253


   Code(s): E66.01 - MORBID (SEVERE) OBESITY DUE TO EXCESS CALORIES   Status: 

Acute   Priority: High   Current Visit: Yes   





(17) Acute hemorrhoid


SNOMED Code(s): 17104564, 24240423


   Code(s): K64.9 - UNSPECIFIED HEMORRHOIDS   Status: Acute   Current Visit: 

Yes   





- Problem List Review


Problem List Initiated/Reviewed/Updated: Yes





- My Orders


Last 24 Hours: 


My Active Orders





08/29/19 08:14


Accu Check [Blood Glucose Check, Bedside] [RC] ONETIME 





08/29/19 11:15


Accu Check [Blood Glucose Check, Bedside] [RC] ONETIME 





08/29/19 22:01


Ready for Discharge [RC] PER UNIT ROUTINE 





08/30/19 05:11


CBC WITH AUTO DIFF [HEME] Routine 


FERRITIN [CHEM] Routine 


IRON/TIBC [CHEM] Routine 


LIPID PANEL [CHEM] Routine 


MAGNESIUM [CHEM] Routine 














- Plan


Plan:: 





07/23/19


Jey Linares MD


See H&P. Admitted to swing bed status to continue PT-OT for distal left femur 

fracture S/P ORIF on 7/5/19 at [facility]. He also tore right rotator cuff a 

few days ago and needs PT to right shoulder.





08/02/19


Doing okay. Working hard in PT-OT. Incision healing well. Still no weight 

bearing on left leg allowed until he sees his orthopedic surgeon on 8/13/19. 

Check labs Monday. He is relatively immobile. Continue DVT prophylaxis until he 

sees his orthopedic surgeon.





08/09/19


Jey Linares MD


Continues to work in PT-OT. Labs reviewed. He has appointment with his ortho 

next week.





8/15/2019


Patient seen to evaluate a lump to left medial thigh, tender and itches per the 

patient. Patient recently had left femur fracture s/p ORIF on 7/5/2019 at 

[place]. Patient saw the orthopedic surgeon on 8/13/2019 and showed him the lump

, was told to monitor. Patient denies any shortness of breath or chest pain. 

Some increased edema noted to the LLE, RLE also noted to have some edema. 

Patient was told by the surgeon that the lump is probably a hematoma from the 

surgery. Lovenox was recently stopped on 8/13/2019. Will continue to monitor 

daily, if any change is noted then the patient may need an ultrasound. Patient 

verbalized understanding and didnt have any more questions.


Yamileth Knight,CNP





08/21/19


Jey Linares MD


Discussed with him left thigh induration area, ultrasound results, lab results. 

Still no weight bearing on left leg. Shoulder pains bilateral. Large prolapsing 

hemrrhoid. Will consult surgeon to check hemrrhoid. Continue PT-OT.





08/29/19


Jey Linares MD


He has more pain in the left leg. He admits he has put some weight on that left 

leg and he wants an xray to make sure the leg is healing okay. X-ray pending. 

Also blood sugar elevated right after eating and drinking can of coke. He 

request recheck and follow up. Discharge planning discussed. Home tomorrow. He 

has follow up with his orthopedic service and he will follow up with his PCP. 

Discussed all of his medications with him and reason for the medications. 

Recommend no alcohol intake.

## 2019-08-30 NOTE — PCM.SN
- Free Text/Narrative


Note: 





8/30/2019


Patient was straining to have a bowel movement and had small amount of blood 

noted from his penis. Will collect a urine and culture. Patient is discharging 

today. Labs reviewed, hgb has stabilized around 9.5 to 10. Patient will need 

further workup in regards to the blood in the urine including urine for 

cytology times 3, KUB ultrasound, and referral to urology. patient will follow 

up with PCP next week and if further bleeding is noted he agrees to go to the 

ER over the weekend.


Yamileth Knight,CNP

## 2019-09-03 NOTE — [id number]
**Discharge Summary





- Hospital Course


Diagnosis: Stroke: No





- Discharge Data


Discharge Date: 08/30/19


Discharge Disposition: Home, Self-Care 01


Condition: Good





- Discharge Diagnosis/Problem(s)


(1) Orthopedic aftercare


SNOMED Code(s): 573075651


   ICD Code: Z47.89 - ENCOUNTER FOR OTHER ORTHOPEDIC AFTERCARE   Status: Acute 

  Priority: High   





(2) Femur fracture, left


SNOMED Code(s): 71588969


   ICD Code: S72.92XA - UNSP FRACTURE OF LEFT FEMUR, INIT ENCNTR FOR CLOSED 

FRACTURE   Status: Acute   Priority: High   


Qualifiers: 


   Encounter type: subsequent encounter   Femur location: distal, unspecified 

portion   Fracture type: closed   Fracture morphology: other fracture   

Fracture healing: with routine healing   Qualified Code(s): S72.492D - Other 

fracture of lower end of left femur, subsequent encounter for closed fracture 

with routine healing   





(3) Fall due to stumbling


SNOMED Code(s): 94601774


   ICD Code: W01.0XXA - FALL SAME LEV FROM SLIP/TRIP W/O STRIKE AGAINST OBJECT, 

INIT   Status: Suspected   Priority: High   


Qualifiers: 


   Encounter type: subsequent encounter   Qualified Code(s): W01.0XXD - Fall on 

same level from slipping, tripping and stumbling without subsequent striking 

against object, subsequent encounter   





(4) Rotator cuff tear arthropathy of right shoulder


SNOMED Code(s): 13256931547826780


   ICD Code: M75.101 - UNSP ROTATR-CUFF TEAR/RUPTR OF RIGHT SHOULDER, NOT TRAUMA

; M12.811 - OTH SPECIFIC ARTHROPATHIES, NEC, RIGHT SHOULDER   Status: Acute   

Priority: High   





(5) Hypomagnesemia


SNOMED Code(s): 992767516


   ICD Code: E83.42 - HYPOMAGNESEMIA   Status: Acute   Priority: Medium   Onset 

Date: 03/26/19   Problem Details: As above   





(6) Hyponatremia


SNOMED Code(s): 93172703


   ICD Code: E87.1 - HYPO-OSMOLALITY AND HYPONATREMIA   Status: Acute   Problem 

Details: Sodium improved 132 although still low we'll send him home on a free 

salt diet and limited water intake to 1500 a day   





(7) Hypotension


SNOMED Code(s): 47312853


   ICD Code: I95.9 - HYPOTENSION, UNSPECIFIED   Status: Acute   


Qualifiers: 


   Hypotension type: postprocedural hypotension   Qualified Code(s): I95.81 - 

Postprocedural hypotension   





(8) Anemia


SNOMED Code(s): 569405091


   ICD Code: D64.9 - ANEMIA, UNSPECIFIED   Status: Chronic   Priority: Medium   

Problem Details: Mild borderline anemia today with the current iron and vitamin 

B-12 supplements   


Qualifiers: 


   Anemia type: unspecified type   Qualified Code(s): D64.9 - Anemia, 

unspecified   





(9) COPD (chronic obstructive pulmonary disease)


SNOMED Code(s): 97550957


   ICD Code: J44.9 - CHRONIC OBSTRUCTIVE PULMONARY DISEASE, UNSPECIFIED   Status

: Chronic   Priority: Medium   Problem Details: Stable by history with no 

recent history of fever, bronchitic type symptoms, etc.   


Qualifiers: 


   COPD type: emphysema   Emphysema type: panlobular   Qualified Code(s): J43.1 

- Panlobular emphysema   





(10) Hypertension


SNOMED Code(s): 80915248


   ICD Code: I10 - ESSENTIAL (PRIMARY) HYPERTENSION   Status: Chronic   Priority

: Medium   Problem Details: Somewhat elevated in the emergency room initially, 

however stable prior to discharge without any additional medications. Continue 

to observe closely by regular provider.   


Qualifiers: 


   Hypertension type: essential hypertension   Qualified Code(s): I10 - 

Essential (primary) hypertension   





(11) Osteoarthritis


SNOMED Code(s): 129360855


   ICD Code: M19.90 - UNSPECIFIED OSTEOARTHRITIS, UNSPECIFIED SITE   Status: 

Chronic   Priority: Medium   Problem Details: Exacerbation of his chronic pain 

syndrome with mild CRP elevation with previous rheumatology consultation in 

[place] and use of biologic the past by his history. Note previous and current 

chronic narcotic use as above. Continue to observe closely by his regular 

provider.   


Qualifiers: 


   Osteoarthritis location: multiple joints   Osteoarthritis type: primary   

Qualified Code(s): M15.0 - Primary generalized (osteo)arthritis   





(12) Renal insufficiency


SNOMED Code(s): 924768238, 594483394


   ICD Code: N28.9 - DISORDER OF KIDNEY AND URETER, UNSPECIFIED   Status: 

Chronic   Priority: Medium   Problem Details: Note previous history of renal 

insufficiency with normal renal function today. Continue to observe closely by 

his regular provider secondary to increased Lasix therapy today. Note patient 

was previously taking his Lasix on a daily rather than when necessary basis 

with no potassium supplement. Note current lisinopril HCTZ therapy. Follow-up 

blood work recommended as per discharge instructions.   





(13) Mixed anxiety and depressive disorder


SNOMED Code(s): 158695610


   ICD Code: F41.8 - OTHER SPECIFIED ANXIETY DISORDERS   Status: Acute   

Priority: High   





(14) Ankylosing spondylitis


SNOMED Code(s): 6351405


   ICD Code: M45.9 - ANKYLOSING SPONDYLITIS OF UNSPECIFIED SITES IN SPINE   

Status: Chronic   


Qualifiers: 


   Ankylosing spondylitis location: multiple sites in spine   Qualified Code(s)

: M45.0 - Ankylosing spondylitis of multiple sites in spine   





(15) Sleep apnea, obstructive


SNOMED Code(s): 65603973


   ICD Code: G47.33 - OBSTRUCTIVE SLEEP APNEA (ADULT) (PEDIATRIC)   Status: 

Chronic   Priority: Medium   





(16) Obesities, morbid


SNOMED Code(s): 667592622


   ICD Code: E66.01 - MORBID (SEVERE) OBESITY DUE TO EXCESS CALORIES   Status: 

Acute   Priority: High   





(17) Acute hemorrhoid


SNOMED Code(s): 13286622, 44011535


   ICD Code: K64.9 - UNSPECIFIED HEMORRHOIDS   Status: Acute   





- Patient Summary/Data


Consults: 


 Consultations





07/23/19 14:05


Consult to Case Management/ [CONS] Routine 


OT Evaluation and Treatment [CONS] Routine 


PT Evaluation and Treatment [CONS] Routine 





08/21/19 12:00


Consult to Physician [CONS] Routine 














- Patient Instructions


Diet: Regular Diet as Tolerated


Activity: As Tolerated, Non Weight Bearing (to left leg)


Driving: Do Not Drive


Showering/Bathing: May Shower


Wound/Incision Care: Keep Operative Site/Wound Site Clean and Dry


Notify Provider of: Fever, Increased Pain, Swelling and Redness, Drainage


Other/Special Instructions: Keep your orthopedic appointment already scheduled 

in [place]. Follow up with your primary care provider within one month.





- Discharge Plan


*PRESCRIPTION DRUG MONITORING PROGRAM REVIEWED*: No


*COPY OF PRESCRIPTION DRUG MONITORING REPORT IN PATIENT [place]: No


Prescriptions/Med Rec: 


Escitalopram [Lexapro] 20 mg PO DAILY #90 tablet


Ferrous Sulfate 325 mg PO QPM #90 tablet


Folic Acid 1 mg PO QPM #90 tablet


Magnesium Oxide 400 mg PO QPM #180 tablet


Melatonin 6 mg PO BEDTIME PRN #100 tablet


 PRN Reason: Insomnia


Omeprazole 20 mg PO DAILY PRN #90 cap.cr


 PRN Reason: Heartburn


Thiamine [Vitamin B-1] 100 mg PO QPM #100 tablet


Triamcinolone Acetonide [Triamcinolone Acetonide 0.1% Crm] 1 dose TOP BID PRN #

1 tube


 PRN Reason: Itching


Home Medications: 


 Home Meds





Multivitamin [Daily Vitamin] 1 tab PO QPM 01/27/15 [History]


Cyclobenzaprine [Flexeril] 10 mg PO TID PRN 03/23/18 [History]


Cholecalciferol (Vitamin D3) [Vitamin D3] 4 tab PO QPM 07/23/19 [History]


Cyanocobalamin (Vitamin B12) [Vitamin B12] 250 mcg PO QPM 07/23/19 [History]


oxyCODONE 10 mg PO Q4HR PRN 07/23/19 [History]


oxyCODONE 20 mg PO Q6HR 07/23/19 [History]


Acetaminophen [Tylenol Extra Strength] 1,000 mg PO TID #100 08/29/19 [Rx]


Albuterol [Ventolin HFA] 1 gm INH Q4H PRN  inhaler 08/29/19 [Rx]


Amitriptyline [Elavil] 25 mg PO BEDTIME #90 08/29/19 [Rx]


Ascorbic Acid 500 mg PO QPM #100 08/29/19 [Rx]


Aspirin [Ecotrin EC] 325 mg PO BID #100 tab.ec 08/29/19 [Rx]


Calcium Citrate/Vitamin D3 [Calcitrate + Vit D Cap (315 MG/250 UNITS)] 1 tab PO 

BID #180 08/29/19 [Rx]


Escitalopram [Lexapro] 20 mg PO DAILY #90 tablet 08/29/19 [Rx]


Ferrous Sulfate 325 mg PO QPM #90 tablet 08/29/19 [Rx]


Folic Acid 1 mg PO QPM #90 tablet 08/29/19 [Rx]


Magnesium Oxide 400 mg PO QPM #180 tablet 08/29/19 [Rx]


Melatonin 6 mg PO BEDTIME PRN #100 tablet 08/29/19 [Rx]


Omeprazole 20 mg PO DAILY PRN #90 cap.cr 08/29/19 [Rx]


Thiamine [Vitamin B-1] 100 mg PO QPM #100 tablet 08/29/19 [Rx]


Triamcinolone Acetonide [Triamcinolone Acetonide 0.1% Crm] 1 dose TOP BID PRN #

1 tube 08/29/19 [Rx]








Oxygen Therapy Mode: Room Air


Patient Handouts:  Aspirin capsules or tablets extended release





- Discharge Summary/Plan Comment


DC Time >30 min.: No





- Patient Data


Vitals - Most Recent: 


 Last Vital Signs











Temp  98.1 F   08/30/19 08:00


 


Pulse  85   08/30/19 08:00


 


Resp  17   08/30/19 08:00


 


BP  128/76   08/30/19 08:00


 


Pulse Ox  95   08/30/19 08:00











Weight - Most Recent: 379 lb 12.8 oz


Med Orders - Current: 


 Current Medications








Discontinued Medications





Acetaminophen (Tylenol Extra Strength)  1,000 mg PO TID [facility]


   Last Admin: 07/23/19 19:01 Dose:  1,000 mg


Acetaminophen (Tylenol Extra Strength)  1,000 mg PO Q6H [facility]


   Last Admin: 08/30/19 11:02 Dose:  1,000 mg


Albuterol (Ventolin Hfa)  1 gm INH Q4H PRN


   PRN Reason: Dyspnea


   Last Admin: 08/26/19 22:27 Dose:  1 puff


Amitriptyline HCl (Elavil)  25 mg PO BEDTIME [facility]


   Last Admin: 07/25/19 19:16 Dose:  25 mg


Amitriptyline HCl (Elavil)  25 mg PO BEDTIME@2200 [facility]


   Last Admin: 08/29/19 23:18 Dose:  25 mg


Artificial Tears (Liquitears 1.4% Ophth Soln)  0 ml EYEBOTH Q1H PRN


   PRN Reason: Dry Eyes


   Last Admin: 08/18/19 10:36 Dose:  2 drop


Ascorbic Acid (Vitamin C)  500 mg PO DAILY@1700 [facility]


   Last Admin: 08/29/19 17:31 Dose:  500 mg


Aspirin (Ecotrin)  325 mg PO BID [facility]


   Last Admin: 08/30/19 07:45 Dose:  325 mg


Calcium Carbonate (Oystcal-D 625 Mg-125 Units)  1 tab PO DAILY [facility]


Calcium Carbonate (Oystcal-D 625 Mg-125 Units)  1 tab PO BID@1000,2200 [facility]


   Last Admin: 08/30/19 11:03 Dose:  1 tab


Cholecalciferol (Vitamin D3)  100 mcg PO DAILY [facility]


   Last Admin: 08/30/19 07:46 Dose:  100 mcg


Cyanocobalamin (Vitamin B12)  250 mcg PO DAILY [facility]


   Last Admin: 08/30/19 07:45 Dose:  250 mcg


Cyclobenzaprine HCl (Flexeril)  10 mg PO TID PRN


   PRN Reason: spasm,pain


   Last Admin: 08/29/19 23:20 Dose:  10 mg


Doxycycline Hyclate (Vibramycin)  100 mg PO BID [facility]


   Last Admin: 07/23/19 19:37 Dose:  Not Given


Doxycycline Hyclate (Vibramycin)  0 mg PO BID [facility]


   Last Admin: 07/23/19 19:55 Dose:  100 mg


Doxycycline Hyclate (Vibramycin)  0 mg PO BID [facility]


   Stop: 07/30/19 08:01


   Last Admin: 07/30/19 08:11 Dose:  100 mg


Enoxaparin Sodium (Lovenox)  40 mg SUBCUT Q12HR [facility]


   Stop: 08/03/19 20:01


   Last Admin: 07/26/19 07:50 Dose:  40 mg


Enoxaparin Sodium (Lovenox)  40 mg SUBCUT Q12HR [facility]


   Stop: 08/12/19 20:01


   Last Admin: 08/12/19 19:23 Dose:  40 mg


Escitalopram Oxalate (Lexapro)  20 mg PO DAILY [facility]


   Last Admin: 08/30/19 07:46 Dose:  20 mg


Ferrous Sulfate (Ferrous Sulfate)  325 mg PO DAILY@1700 [facility]


   Last Admin: 08/29/19 17:31 Dose:  325 mg


Folic Acid (Folic Acid)  1 mg PO DAILY [facility]


   Last Admin: 08/30/19 07:46 Dose:  1 mg


Magnesium Oxide (Magnesium Oxide)  400 mg PO DAILY [facility]


   Last Admin: 07/31/19 08:27 Dose:  400 mg


Magnesium Oxide (Magnesium Oxide)  400 mg PO Q12HR [facility]


   Last Admin: 08/30/19 07:45 Dose:  400 mg


Melatonin (Melatonin)  6 mg PO BEDTIME PRN


   PRN Reason: INSOMNIA


   Last Admin: 08/29/19 23:20 Dose:  6 mg


Multivitamins/Minerals/Vitamin C (Tab-A-Sudha)  1 tab PO DAILY [facility]


   Last Admin: 08/30/19 07:45 Dose:  1 tab


Oxycodone Ir 20mg (Tablets)  1 each PO Q6H [facility]


   Last Admin: 07/23/19 18:50 Dose:  1 each


Oxycodone Ir 10mg (Tablets)  1 each PO Q4HR PRN


   PRN Reason: PAIN 4-10


   Last Admin: 08/29/19 16:10 Dose:  1 each


Oxycodone Ir 20mg (Tabs  **Own Med**)  1 each PO Q4H [facility]


   Last Admin: 07/26/19 17:25 Dose:  1 each


Oxycodone 20 Mg  ** (Own Med**)  0 each PO Q4H [facility]


   Last Admin: 08/30/19 14:52 Dose:  1 each


Omeprazole (Omeprazole)  20 mg PO DAILY [facility]


Omeprazole (Omeprazole)  20 mg PO DAILY PRN


   PRN Reason: Heartburn


   Last Admin: 08/24/19 01:11 Dose:  20 mg


Polyethylene Glycol (Miralax)  17 gm PO DAILY [facility]


Senna/Docusate Sodium (Senna Plus)  2 tab PO BID [facility]


   Last Admin: 08/03/19 08:19 Dose:  2 tab


Senna/Docusate Sodium (Senna Plus)  1 tab PO BID [facility]


   Last Admin: 08/06/19 17:03 Dose:  Not Given


Senna/Docusate Sodium (Senna Plus)  1 tab PO BID PRN


   PRN Reason: Constipation


   Last Admin: 08/15/19 22:18 Dose:  1 tab


Thiamine HCl (Vitamin B-1)  100 mg PO DAILY [facility]


   Last Admin: 08/30/19 07:45 Dose:  100 mg


Triamcinolone Acetonide (Triamcinolone Acetonide 0.1% Crm)  0 gm TOP BID [facility]


   Last Admin: 08/03/19 08:39 Dose:  Not Given


Triamcinolone Acetonide (Triamcinolone Acetonide 0.1% Crm)  0 gm TOP BID PRN


   PRN Reason: Itching


   Last Admin: 08/30/19 08:00 Dose:  1 applic











*Q Meaningful Use (DIS)





- VTE *Q


VTE Mechanical Contraindications *Q: At Risk for Falls

## 2019-11-16 NOTE — EDM.PDOC
ED HPI GENERAL MEDICAL PROBLEM





- General


Chief Complaint: Lower Extremity Injury/Pain


Stated Complaint: Fall, Left knee pain


Time Seen by Provider: 19 16:40


Source of Information: Reports: Patient, Family


History Limitations: Reports: No Limitations





- History of Present Illness


INITIAL COMMENTS - FREE TEXT/NARRATIVE: 





Patient is a 49-year-old with known history of left leg fracture pinning he 

spend about a month in rehabilitation time he was ready to go he went home and 

really hasn't walked much since that time he went home he states that today he 

got up the bed to go to the bathroom and then as he was coming back fell 

slipped down and now complains of severe pain in the left leg at this time he 

was examined previously physical


Onset: Today


Duration: Hour(s):, Constant


Location: Reports: Lower Extremity, Left


Quality: Reports: Ache, Sharp, Stabbing


Severity: Moderate


Improves with: Reports: Movement


Worsens with: Reports: None


Context: Reports: Trauma


Associated Symptoms: Reports: No Other Symptoms


  ** Left Knee


Pain Score (Numeric/FACES): 8





- Related Data


 Allergies











Allergy/AdvReac Type Severity Reaction Status Date / Time


 


celecoxib [From Celebrex] Allergy  unknown Verified 19 16:40


 


fentanyl Allergy  Agitation, Verified 19 16:40





   Hallucination  


 


gabapentin Allergy  Seizure Verified 19 16:40


 


hydrochlorothiazide Allergy  unknown Verified 19 16:40


 


naproxen [From Aleve] Allergy  Other Verified 19 16:40











Home Meds: 


 Home Meds





Multivitamin [Daily Vitamin] 1 tab PO QPM 01/27/15 [History]


Cyclobenzaprine [Flexeril] 10 mg PO TID PRN 18 [History]


Cholecalciferol (Vitamin D3) [Vitamin D3] 4 tab PO QPM 19 [History]


Cyanocobalamin (Vitamin B12) [Vitamin B12] 250 mcg PO QPM 19 [History]


oxyCODONE 30 mg PO Q4HR 19 [History]


Albuterol [Ventolin HFA] 1 gm INH Q4H PRN  inhaler 19 [Rx]


Amitriptyline [Elavil] 25 mg PO BEDTIME #90 19 [Rx]


Escitalopram [Lexapro] 20 mg PO DAILY #90 tablet 19 [Rx]


Folic Acid 1 mg PO QPM #90 tablet 19 [Rx]


Magnesium Oxide 400 mg PO QPM #180 tablet 19 [Rx]


Melatonin 6 mg PO BEDTIME PRN #100 tablet 19 [Rx]


Omeprazole 20 mg PO DAILY PRN #90 cap.cr 19 [Rx]


Thiamine [Vitamin B-1] 100 mg PO QPM #100 tablet 19 [Rx]


Triamcinolone Acetonide [Triamcinolone Acetonide 0.1% Crm] 1 dose TOP BID PRN #

1 tube 19 [Rx]


Acetaminophen [Tylenol Extra Strength] 1,000 mg PO BID@1200,1800 19 [

History]


Ferrous Sulfate 2 tab PO QPM 19 [History]


Ibuprofen 600 mg PO BID@1200,1800 19 [History]











Past Medical History


HEENT History: Reports: Impaired Vision, Other (See Below)


Other HEENT History: He uses OTC reading glasses. Dry eye syndrome in the left 

eye secondary to previous Bell's palsy?


Cardiovascular History: Reports: Hypertension, Syncope, Other (See Below)


Other Cardiovascular History: Patient denies hyperlipidemia despite history of 

LFTs elevation and obesity. Syncopal episode in 2016 secondary to postoperative 

bleeding from pannulectomy. History of d-dimer elevation.


Respiratory History: Reports: Bronchitis, Recurrent, COPD, Intubation, Previous

, Pneumonia, Recurrent, Sleep Apnea, Other (See Below)


Other Respiratory History: He is noncompliant with his CPAP.


Gastrointestinal History: Reports: Cholelithiasis, Diverticulosis, Gastritis, 

GERD, GI Bleed, Inflammatory Bowel Disease, PUD, Other (See Below)


Other Gastrointestinal History: History of upper GI bleed secondary to peptic 

ulcer disease and NSAID abuse in . Crohn's disease by blood work however 

negative follow-up colonoscopies by patient history as below. LFTs elevation 

likely secondary to fatty liver. Recurrent  diverticulitis.


Genitourinary History: Reports: Chronic Renal Insuffiency, Renal Calculus, 

Other (See Below)


Other Genitourinary History: Right-sided urolithiasis in  with spontaneous 

passage.


Musculoskeletal History: Reports: Arthritis, Back Pain, Chronic, Neck Pain, 

Chronic, Osteoarthritis, Other (See Below)


Other Musculoskeletal History: Ankylosis spondylitis. Proximal left fibular 

fracture in  not requiring surgery. Rib contusion on 3/13/15 with suspected 

fracture however negative x-rays. Chronic pain syndrome.


Neurological History: Reports: Headaches, Chronic, Migraines, Neuropathy, 

Peripheral, Other (See Below)


Other Neuro History: Left Sided Bell's palsy . Benign resting tremor.


Psychiatric History: Reports: Addiction, Anxiety, Depression, Hallucinations, 

Other (See Below)


Other Psychiatric History: Alcohol abuse as below with additional history of 

chronic narcotic use secondary to chronic pain syndrome. History of 

intermittent hallucinations and confusion secondary to previous narcotic 

medications, etc.


Endocrine/Metabolic History: Reports: Obesity/BMI 30+, Vitamin D Deficiency, 

Other (See Below)


Other Endocrine/Metabolic History: Hyponatremia. Hypoglycemia.


Hematologic History: Reports: Anemia, B12 Deficiency, Blood Transfusion(s), 

Iron Deficiency, Other (See Below)


Other Hematologic History: Patient with 7 units of packed red blood cells 

transfused secondary postoperative bleeding .


Immunologic History: Reports: None


Oncologic (Cancer) History: Reports: None


Dermatologic History: Reports: Angiodema, Eczema, Other (See Below)


Other Dermatologic History: Angioedema at age 10 with additional episode on 3/26

/19 of unknown etiology.





- Infectious Disease History


Infectious Disease History: Reports: Chicken Pox





- Past Surgical History


Head Surgeries/Procedures: Reports: None


HEENT Surgical History: Reports: Oral Surgery, Other (See Below)


Other HEENT Surgeries/Procedures: Slinger teeth extraction 3 . Additional 

teeth extractions.


Cardiovascular Surgical History: Reports: Varicose


Other Cardiovascular Surgeries/Procedures: Proximal left leg varicose veins 

removal at time of pannulectomy.


Respiratory Surgical History: Reports: None


GI Surgical History: Reports: Appendectomy, Bariatric Procedure, Cholecystectomy

, Colonoscopy, Hernia, Inguinal, Other (See Below)


Other GI Surgeries/Procedures: Laparoscopic appendectomy on 1/28/15. Mariya-en-Y 

gastric bypass in  with with concomitant cholecystectomy. Pannulectomy 

initially on 16 with subsequent postoperative bleeding requiring hematoma 

evacuation and blood transfusion on 16. Bilateral inguinal hernia repair 

including mesh placement on 3/24/15. Last colonoscopy in about 2017. 

Hemorrhoidectomy 2 initially in 2016 and then in 2017.


Male  Surgical History: Reports: Circumcision, Other (See Below)


Other Male  Surgeries/Procedures: Circumcision as an infant.


Endocrine Surgical History: Reports: None


Neurological Surgical History: Reports: None


Musculoskeletal Surgical History: Reports: Arthroscopic Knee, Arthroscopic 

Procedure, Carpal Tunnel, Shoulder Surgery, Other (See Below)


Other Musculoskeletal Surgeries/Procedures:: Right carpal tunnel release in 

about  with left carpal tunnel release in 2017. Right wrist ganglion 

excision 2 in  and . Left shoulder open rotator cuff repair in 2017. Right knee laparoscopic medial meniscus repair in 2015.  Left 

femur ORIF


Dermatological Surgical History: Reports: Plastic Surgical Reconstruction/Repair

, Other (See Below)





- Past Imaging History


Past Imaging History: Reports: Cardiac Echo (Negative echocardiogram on 11/15/

16 with ejection fraction of 60%.), CAT Scan (CT of the head on 3/23/18. CT of 

the abdomen and pelvis on 16 and 16.), MRI (MRI of the left shoulder 

on 18. MRI of the right knee on 12/8/15. MRI of the brain on 13. MRI 

of the right ankle and 14.), PFT (18), Stress Testing (Negative 

Cardiolite Lexiscan on 11/15/16 with ejection fraction of 51%.)





Social & Family History





- Family History


HEENT: Reports: None


Cardiac: Reports: Blood Clots/VTE/DVT, CAD, Heart Failure, High Cholesterol, 

Hypertension, MI, Stent, Other (See Below)


Other Cardiac Family History: Maternal grandmother with fatal MI in her 70s. 

Father with cardiomyopathy and CHF. Paternal uncle with cardiomyopathy with 

fatal CHF at age 71. Paternal uncle with MI at age 50 with history of PTCA/

stents. Hypertension in father, paternal grandfather, maternal grandfather, 

maternal grandmother, maternal uncle and mother. Hyperlipidemia  in all family 

members as above. Paternal grandmother with DVT.


Respiratory: Reports: Asthma, COPD, Pneumothorax, Sleep Apnea, Other (See Below)


Other Respiratory Family Hisory: Paternal uncle COPD, sleep apnea, and 

pneumothorax with history of tobacco use. Another paternal uncle with COPD and 

possible asthma today with history of tobacco use. Father with COPD with 

history of tobacco use.


GI: Reports: None


: Reports: None


OBGYN: Reports: Recurrent Spontaneous 


Other OBGYN Family History: Maternal Grandmother with recurrent SAB.


Musculoskeletal: Reports: Arthritis, Osteoarthritis, RA, Other (See Below)


Other Musculoskeletal Family History: Father and maternal grandfather with 

rheumatoid arthritis. Maternal grandparents with osteoarthritis.


Neurological: Reports: Other (See Below)


Other Neurological Family History: Paternal grandmother with history of OBS.


Psychiatric: Reports: Anxiety, Depression, Psychosis, Schizophrenia, Other (See 

Below)


Other Psychiatric Family History: Maternal grandmother anxiety depression 

disorder. Maternal aunt 2 with history of psychosis. Brother with 

schizophrenia. Paternal uncle with history of alcohol abuse.


Endocrine/Metabolic: Reports: Diabetes, type II, IDDM, Other (See Below)


Other Endocrine/Metabolic Family History: Father with AODM. Paternal uncle and 

2 with IDDM. Obesity in father, maternal grandparents, brothers 2 paternal 

uncles 2, maternal uncle 1, and  maternal aunts 4


Hematologic: Reports: None


Immunologic: Reports: None


Dermatologic: Reports: None


Oncologic: Reports: Brain, Lung, Lymphoma, Pancreatic, Prostate, Other (See 

Below)


Other Oncologic Family History: Mother with fatal lung cancer at age 63 with 

history of tobacco use. Father with history of fatal brain tumor at age 60 with 

history of tobacco use. Paternal uncle with history of prostate cancer and 

fatal pancreatic cancer at age 61 with history of alcohol abuse. Maternal uncle 

with fatal lung cancer at age 49 with history of tobacco use. Maternal aunt 

with fatal lymphoma at age 72.





- Tobacco Use


Smoking Status *Q: Never Smoker


Second Hand Smoke Exposure: No





- Caffeine Use


Caffeine Use: Reports: Coffee





- Recreational Drug Use


Recreational Drug Use: No





- Living Situation & Occupation


Living situation: Reports:  (1990. 2 children.), with Family (Wife)


Occupation: Disabled (Secondary to his arthritis since 2018.)





Review of Systems





- Review of Systems


Review Of Systems: See Below


Constitutional: Reports: Chills


Eyes: Reports: No Symptoms


Ears: Reports: No Symptoms


Nose: Reports: No Symptoms


Mouth/Throat: Reports: No Symptoms


Respiratory: Reports: No Symptoms


Cardiovascular: Reports: No Symptoms


GI/Abdominal: Reports: No Symptoms


Musculoskeletal: Reports: No Symptoms


Skin: Reports: No Symptoms


Neurological: Reports: No Symptoms


Psychiatric: Reports: No Symptoms





ED EXAM, GENERAL





- Physical Exam


Exam: See Below


Exam Limited By: No Limitations


General Appearance: Alert, WD/WN, No Apparent Distress


Ears: Normal External Exam, Normal Canal, Hearing Grossly Normal, Normal TMs


Ear Exam: Bilateral Ear: Auricle Normal, Canal Normal, TM normal


Nose: Normal Inspection, Normal Mucosa, No Blood


Throat/Mouth: Normal Inspection, Normal Lips, Normal Teeth, Normal Gums, Normal 

Oropharynx, Normal Voice, No Airway Compromise


Head: Atraumatic, Normocephalic


Neck: Normal Inspection, Supple, Non-Tender, Full Range of Motion


Respiratory/Chest: No Respiratory Distress, Lungs Clear, Normal Breath Sounds, 

No Accessory Muscle Use, Chest Non-Tender


Cardiovascular: Normal Peripheral Pulses, Regular Rate, Rhythm, No Edema, No 

Gallop, No JVD, No Murmur, No Rub


GI/Abdominal: Normal Bowel Sounds, Soft, Non-Tender, No Organomegaly, No 

Distention, No Abnormal Bruit, No Mass


Back Exam: Normal Inspection, Full Range of Motion, NT


Extremities: Limited Range of Motion


Neurological: Alert, Oriented, CN II-XII Intact, Normal Cognition, Normal Gait, 

Normal Reflexes, No Motor/Sensory Deficits


Psychiatric: Normal Affect, Normal Mood


Skin Exam: Warm, Dry, Intact, Normal Color, No Rash


Lymphatic: No Adenopathy





Course





- Vital Signs


Last Recorded V/S: 





 Last Vital Signs











Temp  97.3 F   19 17:00


 


Pulse  58 L  19 17:00


 


Resp  20   19 17:00


 


BP  165/80 H  19 17:00


 


Pulse Ox  99   19 17:00














- Orders/Labs/Meds


Orders: 





 Active Orders 24 hr











 Category Date Time Status


 


 Femur Min 2V Lt [CR] Stat Exams  19 16:38 Taken


 


 Femur wo Cont Lt [CT] Stat Exams  19 18:13 Taken


 


 Knee 3V Lt [CR] Stat Exams  19 16:38 Ordered


 


 Tibia Fibula Lt [CR] Stat Exams  19 16:38 Ordered











Labs: 





 Laboratory Tests











  19 Range/Units





  17:23 17:30 17:30 


 


WBC   5.6   (4.0-10.2)  K/uL


 


RBC   3.22 L   (4.33-5.41)  M/uL


 


Hgb   8.9 L   (13.1-16.8)  g/dL


 


Hct   29.1 L   (39.0-49.0)  %


 


MCV   90.4  D   (84.0-98.0)  fL


 


MCH   27.6 L   (28.2-33.3)  pg


 


MCHC   30.6 L   (31.7-36.0)  g/dL


 


RDW   14.4 H   (11.2-14.1)  %


 


Plt Count   204   (150-350)  K/uL


 


Neut % (Auto)   79.3   (45.0-80.0)  %


 


Lymph % (Auto)   11.3   (10.0-50.0)  %


 


Mono % (Auto)   7.8   (2.0-14.0)  %


 


Eos % (Auto)   1.4   (0.0-5.0)  %


 


Baso % (Auto)   0.2   (0.0-2.0)  %


 


Neut # (Auto)   4.47   (1.40-7.00)  K/uL


 


Lymph # (Auto)   0.64   (0.50-3.50)  K/uL


 


Mono # (Auto)   0.44   (0.00-1.00)  K/uL


 


Eos # (Auto)   0.08   (0.00-0.50)  K/uL


 


Baso # (Auto)   0.01   (0.00-0.20)  K/uL


 


Sodium    142  (136-145)  mmol/L


 


Potassium    3.9  (3.5-5.1)  mmol/L


 


Chloride    107  ()  mmol/L


 


Carbon Dioxide    23.9  (21.0-32.0)  mmol/L


 


BUN    11  (7-18)  mg/dL


 


Creatinine    0.92  (0.51-1.17)  mg/dL


 


Est Cr Clr Drug Dosing    106.61  mL/min


 


Estimated GFR (MDRD)    > 60  mL/min


 


Glucose    102  ()  mg/dL


 


Calcium    8.3 L  (8.5-10.1)  mg/dL


 


Total Bilirubin    0.3  (0.2-1.0)  mg/dL


 


AST    13 L  (15-37)  U/L


 


ALT    18  (12-78)  U/L


 


Alkaline Phosphatase    112  ()  IU/L


 


Total Protein    7.2  (6.4-8.2)  g/dL


 


Albumin    3.1 L  (3.4-5.0)  g/dL


 


Specimen Type  .    


 


Urine Color  Light yellow    


 


Urine Appearance  Clear    


 


Urine pH  6.0    (5.0-9.0)  


 


Ur Specific Gravity  <= 1.005    (1.005-1.030)  


 


Urine Protein  Negative    (NEGATIVE)  mg/dL


 


Urine Glucose (UA)  Negative    (NEGATIVE)  mg/dL


 


Urine Ketones  Negative    (NEGATIVE)  mg/dL


 


Urine Occult Blood  Negative    (NEGATIVE)  


 


Urine Nitrite  Negative    (NEGATIVE)  


 


Urine Bilirubin  Negative    (NEGATIVE)  


 


Urine Urobilinogen  0.2    (0.2-1.0)  E.U./dL


 


Ur Leukocyte Esterase  Negative    (NEGATIVE)  











Meds: 





Medications














Discontinued Medications














Generic Name Dose Route Start Last Admin





  Trade Name [place]  PRN Reason Stop Dose Admin


 


Hydromorphone HCl  0.5 mg  19 18:21  19 18:25





  Dilaudid  IM  19 18:22  0.5 mg





  ONETIME ONE   Administration





     





     





     





     














Departure





- Departure


Time of Disposition: 20:41


Disposition: Home, Self-Care 01


Condition: Fair


Clinical Impression: 


 Leg pain, Contusion of thigh, Fracture of femur








- Discharge Information


*PRESCRIPTION DRUG MONITORING PROGRAM REVIEWED*: No


*COPY OF PRESCRIPTION DRUG MONITORING REPORT IN PATIENT ROSALINO: No


Referrals: 


Theodora Kruger PA-C [Primary Care Provider] - 


Care Plan Goals: 


Spoke with Dr. Chowdary orthopod after the following instructions of data x-rays 

and a CT of the thigh left side we will go ahead and send him home he is to 

follow-up with Dr. Chowdary in a couple days. "As an outpatient a CBC for Monday





- My Orders


Last 24 Hours: 





My Active Orders





19 16:38


Femur Min 2V Lt [CR] Stat 


Knee 3V Lt [CR] Stat 


Tibia Fibula Lt [CR] Stat 





19 18:13


Femur wo Cont Lt [CT] Stat 














- Assessment/Plan


Last 24 Hours: 





My Active Orders





19 16:38


Femur Min 2V Lt [CR] Stat 


Knee 3V Lt [CR] Stat 


Tibia Fibula Lt [CR] Stat 





19 18:13


Femur wo Cont Lt [CT] Stat

## 2019-11-17 NOTE — EDM.PDOC
ED HPI GENERAL MEDICAL PROBLEM





- General


Chief Complaint: General


Stated Complaint: hallucinations


Time Seen by Provider: 19 10:50


Source of Information: Reports: Patient


History Limitations: Reports: Physical Impairment (obesity)





- History of Present Illness


INITIAL COMMENTS - FREE TEXT/NARRATIVE: 





Patient is a 49-year-old gentleman who is seen in the ER today with left leg 

pain and hydrate examination patient states that he couldn't sleep last night 

at [facility] he took oxycodone and Xanax Flexeril during the night 

when driving back to see us here she said that he pain on the urinal and 

emptied the urinal on the cat. He states he hasn't slept last night but this 

time.


Onset: Today


Duration: Hour(s):


Location: Reports: Head, Lower Extremity, Left


Quality: Reports: Ache, Sharp (Left leg)


Severity: Moderate


Improves with: Reports: Cold Therapy


Worsens with: Reports: None


Associated Symptoms: Reports: Other (Anxiety)





- Related Data


 Allergies











Allergy/AdvReac Type Severity Reaction Status Date / Time


 


celecoxib [From Celebrex] Allergy  unknown Verified 19 16:40


 


fentanyl Allergy  Agitation, Verified 19 16:40





   Hallucination  


 


gabapentin Allergy  Seizure Verified 19 16:40


 


hydrochlorothiazide Allergy  unknown Verified 19 16:40


 


naproxen [From Aleve] Allergy  Other Verified 19 16:40











Home Meds: 


 Home Meds





Multivitamin [Daily Vitamin] 1 tab PO QPM 01/27/15 [History]


Cyclobenzaprine [Flexeril] 10 mg PO TID PRN 18 [History]


Cholecalciferol (Vitamin D3) [Vitamin D3] 4 tab PO QPM 19 [History]


Cyanocobalamin (Vitamin B12) [Vitamin B12] 250 mcg PO QPM 19 [History]


oxyCODONE 30 mg PO Q4HR 19 [History]


Albuterol [Ventolin HFA] 1 gm INH Q4H PRN  inhaler 19 [Rx]


Amitriptyline [Elavil] 25 mg PO BEDTIME #90 19 [Rx]


Escitalopram [Lexapro] 20 mg PO DAILY #90 tablet 19 [Rx]


Folic Acid 1 mg PO QPM #90 tablet 19 [Rx]


Magnesium Oxide 400 mg PO QPM #180 tablet 19 [Rx]


Melatonin 6 mg PO BEDTIME PRN #100 tablet 19 [Rx]


Omeprazole 20 mg PO DAILY PRN #90 cap.cr 19 [Rx]


Thiamine [Vitamin B-1] 100 mg PO QPM #100 tablet 19 [Rx]


Triamcinolone Acetonide [Triamcinolone Acetonide 0.1% Crm] 1 dose TOP BID PRN #

1 tube 19 [Rx]


Acetaminophen [Tylenol Extra Strength] 1,000 mg PO BID@1200,1800 19 [

History]


Ciprofloxacin HCl [Cipro] 500 mg PO BID 10 Days #14 tablet 19 [Rx]


Ferrous Sulfate 2 tab PO QPM 19 [History]


Ibuprofen 600 mg PO BID@1200,1800 19 [History]











Past Medical History


HEENT History: Reports: Impaired Vision, Other (See Below)


Other HEENT History: He uses OTC reading glasses. Dry eye syndrome in the left 

eye secondary to previous Bell's palsy?


Cardiovascular History: Reports: Hypertension, Syncope, Other (See Below)


Other Cardiovascular History: Patient denies hyperlipidemia despite history of 

LFTs elevation and obesity. Syncopal episode in 2016 secondary to postoperative 

bleeding from pannulectomy. History of d-dimer elevation.


Respiratory History: Reports: Bronchitis, Recurrent, COPD, Intubation, Previous

, Pneumonia, Recurrent, Sleep Apnea, Other (See Below)


Other Respiratory History: He is noncompliant with his CPAP.


Gastrointestinal History: Reports: Cholelithiasis, Diverticulosis, Gastritis, 

GERD, GI Bleed, Inflammatory Bowel Disease, PUD, Other (See Below)


Other Gastrointestinal History: History of upper GI bleed secondary to peptic 

ulcer disease and NSAID abuse in . Crohn's disease by blood work however 

negative follow-up colonoscopies by patient history as below. LFTs elevation 

likely secondary to fatty liver. Recurrent  diverticulitis.


Genitourinary History: Reports: Chronic Renal Insuffiency, Renal Calculus, 

Other (See Below)


Other Genitourinary History: Right-sided urolithiasis in  with spontaneous 

passage.


Musculoskeletal History: Reports: Arthritis, Back Pain, Chronic, Neck Pain, 

Chronic, Osteoarthritis, Other (See Below)


Other Musculoskeletal History: Ankylosis spondylitis. Proximal left fibular 

fracture in  not requiring surgery. Rib contusion on 3/13/15 with suspected 

fracture however negative x-rays. Chronic pain syndrome.


Neurological History: Reports: Headaches, Chronic, Migraines, Neuropathy, 

Peripheral, Other (See Below)


Other Neuro History: Left Sided Bell's palsy . Benign resting tremor.


Psychiatric History: Reports: Addiction, Anxiety, Depression, Hallucinations, 

Other (See Below)


Other Psychiatric History: Alcohol abuse as below with additional history of 

chronic narcotic use secondary to chronic pain syndrome. History of 

intermittent hallucinations and confusion secondary to previous narcotic 

medications, etc.


Endocrine/Metabolic History: Reports: Obesity/BMI 30+, Vitamin D Deficiency, 

Other (See Below)


Other Endocrine/Metabolic History: Hyponatremia. Hypoglycemia.


Hematologic History: Reports: Anemia, B12 Deficiency, Blood Transfusion(s), 

Iron Deficiency, Other (See Below)


Other Hematologic History: Patient with 7 units of packed red blood cells 

transfused secondary postoperative bleeding 2016.


Immunologic History: Reports: None


Oncologic (Cancer) History: Reports: None


Dermatologic History: Reports: Angiodema, Eczema, Other (See Below)


Other Dermatologic History: Angioedema at age 10 with additional episode on 3/26

/19 of unknown etiology.





- Infectious Disease History


Infectious Disease History: Reports: Chicken Pox





- Past Surgical History


Head Surgeries/Procedures: Reports: None


HEENT Surgical History: Reports: Oral Surgery, Other (See Below)


Other HEENT Surgeries/Procedures: Keyes teeth extraction 3 . Additional 

teeth extractions.


Cardiovascular Surgical History: Reports: Varicose


Other Cardiovascular Surgeries/Procedures: Proximal left leg varicose veins 

removal at time of pannulectomy.


Respiratory Surgical History: Reports: None


GI Surgical History: Reports: Appendectomy, Bariatric Procedure, Cholecystectomy

, Colonoscopy, Hernia, Inguinal, Other (See Below)


Other GI Surgeries/Procedures: Laparoscopic appendectomy on 1/28/15. Mariya-en-Y 

gastric bypass in  with with concomitant cholecystectomy. Pannulectomy 

initially on 16 with subsequent postoperative bleeding requiring hematoma 

evacuation and blood transfusion on 16. Bilateral inguinal hernia repair 

including mesh placement on 3/24/15. Last colonoscopy in about 2017. 

Hemorrhoidectomy 2 initially in 2016 and then in 2017.


Male  Surgical History: Reports: Circumcision, Other (See Below)


Other Male  Surgeries/Procedures: Circumcision as an infant.


Endocrine Surgical History: Reports: None


Neurological Surgical History: Reports: None


Musculoskeletal Surgical History: Reports: Arthroscopic Knee, Arthroscopic 

Procedure, Carpal Tunnel, Shoulder Surgery, Other (See Below)


Other Musculoskeletal Surgeries/Procedures:: Right carpal tunnel release in 

about  with left carpal tunnel release in 2017. Right wrist ganglion 

excision 2 in  and . Left shoulder open rotator cuff repair in 2017. Right knee laparoscopic medial meniscus repair in 2015.  Left 

femur ORIF


Dermatological Surgical History: Reports: Plastic Surgical Reconstruction/Repair

, Other (See Below)





- Past Imaging History


Past Imaging History: Reports: Cardiac Echo (Negative echocardiogram on 11/15/

16 with ejection fraction of 60%.), CAT Scan (CT of the head on 3/23/18. CT of 

the abdomen and pelvis on 16 and 16.), MRI (MRI of the left shoulder 

on 18. MRI of the right knee on 12/8/15. MRI of the brain on 13. MRI 

of the right ankle and 14.), PFT (18), Stress Testing (Negative 

Cardiolite Lexiscan on 11/15/16 with ejection fraction of 51%.)





Social & Family History





- Family History


HEENT: Reports: None


Cardiac: Reports: Blood Clots/VTE/DVT, CAD, Heart Failure, High Cholesterol, 

Hypertension, MI, Stent, Other (See Below)


Other Cardiac Family History: Maternal grandmother with fatal MI in her 70s. 

Father with cardiomyopathy and CHF. Paternal uncle with cardiomyopathy with 

fatal CHF at age 71. Paternal uncle with MI at age 50 with history of PTCA/

stents. Hypertension in father, paternal grandfather, maternal grandfather, 

maternal grandmother, maternal uncle and mother. Hyperlipidemia  in all family 

members as above. Paternal grandmother with DVT.


Respiratory: Reports: Asthma, COPD, Pneumothorax, Sleep Apnea, Other (See Below)


Other Respiratory Family Hisory: Paternal uncle COPD, sleep apnea, and 

pneumothorax with history of tobacco use. Another paternal uncle with COPD and 

possible asthma today with history of tobacco use. Father with COPD with 

history of tobacco use.


GI: Reports: None


: Reports: None


OBGYN: Reports: Recurrent Spontaneous 


Other OBGYN Family History: Maternal Grandmother with recurrent SAB.


Musculoskeletal: Reports: Arthritis, Osteoarthritis, RA, Other (See Below)


Other Musculoskeletal Family History: Father and maternal grandfather with 

rheumatoid arthritis. Maternal grandparents with osteoarthritis.


Neurological: Reports: Other (See Below)


Other Neurological Family History: Paternal grandmother with history of OBS.


Psychiatric: Reports: Anxiety, Depression, Psychosis, Schizophrenia, Other (See 

Below)


Other Psychiatric Family History: Maternal grandmother anxiety depression 

disorder. Maternal aunt 2 with history of psychosis. Brother with 

schizophrenia. Paternal uncle with history of alcohol abuse.


Endocrine/Metabolic: Reports: Diabetes, type II, IDDM, Other (See Below)


Other Endocrine/Metabolic Family History: Father with AODM. Paternal uncle and 

2 with IDDM. Obesity in father, maternal grandparents, brothers 2 paternal 

uncles 2, maternal uncle 1, and  maternal aunts 4


Hematologic: Reports: None


Immunologic: Reports: None


Dermatologic: Reports: None


Oncologic: Reports: Brain, Lung, Lymphoma, Pancreatic, Prostate, Other (See 

Below)


Other Oncologic Family History: Mother with fatal lung cancer at age 63 with 

history of tobacco use. Father with history of fatal brain tumor at age 60 with 

history of tobacco use. Paternal uncle with history of prostate cancer and 

fatal pancreatic cancer at age 61 with history of alcohol abuse. Maternal uncle 

with fatal lung cancer at age 49 with history of tobacco use. Maternal aunt 

with fatal lymphoma at age 72.





- Caffeine Use


Caffeine Use: Reports: Coffee





- Living Situation & Occupation


Living situation: Reports:  (. 2 children.), with Family (Wife)


Occupation: Disabled (Secondary to his arthritis since 2018.)





ED ROS GENERAL





- Review of Systems


Review Of Systems: See Below


Constitutional: Reports: No Symptoms


HEENT: Reports: No Symptoms


Respiratory: Reports: Shortness of Breath (Secondary to anxiety)


Cardiovascular: Reports: No Symptoms


Endocrine: Reports: No Symptoms


GI/Abdominal: Reports: No Symptoms


: Reports: Incontinence


Musculoskeletal: Reports: Leg Pain


Skin: Reports: No Symptoms


Psychiatric: Reports: Anxiety, Hallucinations


Hematologic/Lymphatic: Reports: Anemia


Immunologic: Reports: No Symptoms





ED EXAM, GENERAL





- Physical Exam


Exam: See Below


Exam Limited By: Physical Impairment (obesity)


General Appearance: Anxious, Mild Distress


Ears: Normal External Exam, Normal Canal, Hearing Grossly Normal, Normal TMs


Ear Exam: Bilateral Ear: Auricle Normal, Canal Normal, TM normal


Nose: Normal Inspection, Normal Mucosa, No Blood


Throat/Mouth: Normal Inspection, Normal Lips, Normal Teeth, Normal Gums, Normal 

Oropharynx, Normal Voice, No Airway Compromise


Head: Atraumatic, Normocephalic


Neck: Normal Inspection


Respiratory/Chest: Normal Breath Sounds, Prolonged Expiration


Cardiovascular: Normal Peripheral Pulses, Regular Rate, Rhythm, No Edema, No 

Gallop, No JVD, No Murmur, No Rub


GI/Abdominal: Normal Bowel Sounds, Soft, Non-Tender, No Organomegaly, No 

Distention, No Abnormal Bruit, No Mass


 (Male) Exam: Deferred


Rectal (Males) Exam: Deferred


Neurological: Alert, Oriented, CN II-XII Intact, Normal Cognition, Normal Gait, 

Normal Reflexes, No Motor/Sensory Deficits


Psychiatric: Normal Affect, Normal Mood


Skin Exam: Warm, Dry, Intact, Normal Color, No Rash


Lymphatic: No Adenopathy





Course





- Vital Signs


Last Recorded V/S: 


 Last Vital Signs











Temp  98.1 F   19 10:47


 


Pulse  50 L  19 10:47


 


Resp  16   19 10:47


 


BP  148/70 H  19 10:47


 


Pulse Ox  95   19 10:47














- Orders/Labs/Meds


Labs: 


 Laboratory Tests











  19 Range/Units





  11:05 11:05 


 


WBC  6.0   (4.0-10.2)  K/uL


 


RBC  3.26 L   (4.33-5.41)  M/uL


 


Hgb  9.0 L   (13.1-16.8)  g/dL


 


Hct  29.6 L   (39.0-49.0)  %


 


MCV  90.8   (84.0-98.0)  fL


 


MCH  27.6 L   (28.2-33.3)  pg


 


MCHC  30.4 L   (31.7-36.0)  g/dL


 


RDW  14.8 H   (11.2-14.1)  %


 


Plt Count  213   (150-350)  K/uL


 


Neut % (Auto)  80.8 H   (45.0-80.0)  %


 


Lymph % (Auto)  10.2   (10.0-50.0)  %


 


Mono % (Auto)  8.5   (2.0-14.0)  %


 


Eos % (Auto)  0.3   (0.0-5.0)  %


 


Baso % (Auto)  0.2   (0.0-2.0)  %


 


Neut # (Auto)  4.83   (1.40-7.00)  K/uL


 


Lymph # (Auto)  0.61   (0.50-3.50)  K/uL


 


Mono # (Auto)  0.51   (0.00-1.00)  K/uL


 


Eos # (Auto)  0.02   (0.00-0.50)  K/uL


 


Baso # (Auto)  0.01   (0.00-0.20)  K/uL


 


Sodium   143  (136-145)  mmol/L


 


Potassium   3.8  (3.5-5.1)  mmol/L


 


Chloride   108 H  ()  mmol/L


 


Carbon Dioxide   23.8  (21.0-32.0)  mmol/L


 


BUN   8  (7-18)  mg/dL


 


Creatinine   0.89  (0.51-1.17)  mg/dL


 


Est Cr Clr Drug Dosing   TNP  


 


Estimated GFR (MDRD)   > 60  mL/min


 


Glucose   100  ()  mg/dL


 


Calcium   8.4 L  (8.5-10.1)  mg/dL


 


Total Bilirubin   0.3  (0.2-1.0)  mg/dL


 


AST   14 L  (15-37)  U/L


 


ALT   19  (12-78)  U/L


 


Alkaline Phosphatase   118 H  ()  IU/L


 


Total Protein   7.7  (6.4-8.2)  g/dL


 


Albumin   3.4  (3.4-5.0)  g/dL














Departure





- Departure


Time of Disposition: 11:42


Disposition: Home, Self-Care 01


Condition: Fair


Clinical Impression: 


 Hallucinations, visual





Anemia


Qualifiers:


 Anemia type: unspecified type Qualified Code(s): D64.9 - Anemia, unspecified








- Discharge Information


*PRESCRIPTION DRUG MONITORING PROGRAM REVIEWED*: No


*COPY OF PRESCRIPTION DRUG MONITORING REPORT IN PATIENT [place]: No


Forms:  ED Department Discharge


Care Plan Goals: 


Hallucinations due to polypharmacy I explained to patient that I will probably 

send him to [place] but he prefers to go home he is not a threat to him or others 

I'll send him home with wife

## 2019-11-19 NOTE — EDM.PDOC
ED HPI GENERAL MEDICAL PROBLEM





- General


Chief Complaint: Gastrointestinal Problem


Stated Complaint: black stool


Time Seen by Provider: 19 16:25


Source of Information: Reports: Patient


History Limitations: Reports: No Limitations





- History of Present Illness


INITIAL COMMENTS - FREE TEXT/NARRATIVE: 





3 black stools today.  Normal stools yesterday. 


No acute changes in chronic pain reported other than brief right sided 

abdominal pain during last bowel movement. 


ROS otherwise unremarkable for acute changes.





HEENT/no changes


Resp/no increased SOB/cough/sputum


CV/no acute changes/chest pain/syncope/dizziness


GI/+ for black stools. Currently no acute abdominal pain.  No nausea/emesis. No 

increased looseness of stools. 


/no acute changes


Neuro/no acute changes


MS/no acute changes. Long history of chronic pain issues however. 





Has been taking ibuprofen





- Related Data


 Allergies











Allergy/AdvReac Type Severity Reaction Status Date / Time


 


celecoxib [From Celebrex] Allergy  unknown Verified 19 16:36


 


fentanyl Allergy  Agitation, Verified 19 16:36





   Hallucination  


 


gabapentin Allergy  Seizure Verified 19 16:36


 


hydrochlorothiazide Allergy  unknown Verified 19 16:36


 


naproxen [From Aleve] Allergy  Other Verified 19 16:36











Home Meds: 


 Home Meds





Multivitamin [Daily Vitamin] 1 tab PO QPM 01/27/15 [History]


Cyclobenzaprine [Flexeril] 10 mg PO TID PRN 18 [History]


Cholecalciferol (Vitamin D3) [Vitamin D3] 4 tab PO QPM 19 [History]


Cyanocobalamin (Vitamin B12) [Vitamin B12] 250 mcg PO QPM 19 [History]


oxyCODONE 30 mg PO Q4HR 19 [History]


Albuterol [Ventolin HFA] 1 gm INH Q4H PRN  inhaler 19 [Rx]


Amitriptyline [Elavil] 25 mg PO BEDTIME #90 19 [Rx]


Escitalopram [Lexapro] 20 mg PO DAILY #90 tablet 19 [Rx]


Folic Acid 1 mg PO QPM #90 tablet 19 [Rx]


Magnesium Oxide 400 mg PO QPM #180 tablet 19 [Rx]


Melatonin 6 mg PO BEDTIME PRN #100 tablet 19 [Rx]


Omeprazole 20 mg PO DAILY PRN #90 cap.cr 19 [Rx]


Thiamine [Vitamin B-1] 100 mg PO QPM #100 tablet 19 [Rx]


Triamcinolone Acetonide [Triamcinolone Acetonide 0.1% Crm] 1 dose TOP BID PRN #

1 tube 19 [Rx]


Acetaminophen [Tylenol Extra Strength] 1,000 mg PO BID@1200,1800 19 [

History]


Ferrous Sulfate 2 tab PO QPM 19 [History]


Ibuprofen 600 mg PO BID@1200,1800 19 [History]











Past Medical History


HEENT History: Reports: Impaired Vision, Other (See Below)


Other HEENT History: He uses OTC reading glasses. Dry eye syndrome in the left 

eye secondary to previous Bell's palsy?


Cardiovascular History: Reports: Hypertension, Syncope, Other (See Below)


Other Cardiovascular History: Patient denies hyperlipidemia despite history of 

LFTs elevation and obesity. Syncopal episode in 2016 secondary to postoperative 

bleeding from pannulectomy. History of d-dimer elevation.


Respiratory History: Reports: Bronchitis, Recurrent, COPD, Intubation, Previous

, Pneumonia, Recurrent, Sleep Apnea, Other (See Below)


Other Respiratory History: He is noncompliant with his CPAP.


Gastrointestinal History: Reports: Cholelithiasis, Diverticulosis, Gastritis, 

GERD, GI Bleed, Inflammatory Bowel Disease, PUD, Other (See Below)


Other Gastrointestinal History: History of upper GI bleed secondary to peptic 

ulcer disease and NSAID abuse in . Crohn's disease by blood work however 

negative follow-up colonoscopies by patient history as below. LFTs elevation 

likely secondary to fatty liver. Recurrent  diverticulitis.


Genitourinary History: Reports: Chronic Renal Insuffiency, Renal Calculus, 

Other (See Below)


Other Genitourinary History: Right-sided urolithiasis in  with spontaneous 

passage.


Musculoskeletal History: Reports: Arthritis, Back Pain, Chronic, Neck Pain, 

Chronic, Osteoarthritis, Other (See Below)


Other Musculoskeletal History: Ankylosis spondylitis. Proximal left fibular 

fracture in  not requiring surgery. Rib contusion on 3/13/15 with suspected 

fracture however negative x-rays. Chronic pain syndrome.


Neurological History: Reports: Headaches, Chronic, Migraines, Neuropathy, 

Peripheral, Other (See Below)


Other Neuro History: Left Sided Bell's palsy . Benign resting tremor.


Psychiatric History: Reports: Addiction, Anxiety, Depression, Hallucinations, 

Other (See Below)


Other Psychiatric History: Alcohol abuse as below with additional history of 

chronic narcotic use secondary to chronic pain syndrome. History of 

intermittent hallucinations and confusion secondary to previous narcotic 

medications, etc.


Endocrine/Metabolic History: Reports: Obesity/BMI 30+, Vitamin D Deficiency, 

Other (See Below)


Other Endocrine/Metabolic History: Hyponatremia. Hypoglycemia.


Hematologic History: Reports: Anemia, B12 Deficiency, Blood Transfusion(s), 

Iron Deficiency, Other (See Below)


Other Hematologic History: Patient with 7 units of packed red blood cells 

transfused secondary postoperative bleeding 2016.


Immunologic History: Reports: None


Oncologic (Cancer) History: Reports: None


Dermatologic History: Reports: Angiodema, Eczema, Other (See Below)


Other Dermatologic History: Angioedema at age 10 with additional episode on 3/26

/19 of unknown etiology.





- Infectious Disease History


Infectious Disease History: Reports: Chicken Pox





- Past Surgical History


Head Surgeries/Procedures: Reports: None


HEENT Surgical History: Reports: Oral Surgery, Other (See Below)


Other HEENT Surgeries/Procedures: [place] teeth extraction 3 . Additional 

teeth extractions.


Cardiovascular Surgical History: Reports: Varicose


Other Cardiovascular Surgeries/Procedures: Proximal left leg varicose veins 

removal at time of pannulectomy.


Respiratory Surgical History: Reports: None


GI Surgical History: Reports: Appendectomy, Bariatric Procedure, Cholecystectomy

, Colonoscopy, Hernia, Inguinal, Other (See Below)


Other GI Surgeries/Procedures: Laparoscopic appendectomy on 1/28/15. Mariya-en-Y 

gastric bypass in  with with concomitant cholecystectomy. Pannulectomy 

initially on 16 with subsequent postoperative bleeding requiring hematoma 

evacuation and blood transfusion on 16. Bilateral inguinal hernia repair 

including mesh placement on 3/24/15. Last colonoscopy in about . 

Hemorrhoidectomy 2 initially in  and then in 2017.


Male  Surgical History: Reports: Circumcision, Other (See Below)


Other Male  Surgeries/Procedures: Circumcision as an infant.


Endocrine Surgical History: Reports: None


Neurological Surgical History: Reports: None


Musculoskeletal Surgical History: Reports: Arthroscopic Knee, Arthroscopic 

Procedure, Carpal Tunnel, Shoulder Surgery, Other (See Below)


Other Musculoskeletal Surgeries/Procedures:: Right carpal tunnel release in 

about  with left carpal tunnel release in 2017. Right wrist ganglion 

excision 2 in  and . Left shoulder open rotator cuff repair in 2017. Right knee laparoscopic medial meniscus repair in 2015.  Left 

femur ORIF


Dermatological Surgical History: Reports: Plastic Surgical Reconstruction/Repair

, Other (See Below)





- Past Imaging History


Past Imaging History: Reports: Cardiac Echo (Negative echocardiogram on 11/15/

16 with ejection fraction of 60%.), CAT Scan (CT of the head on 3/23/18. CT of 

the abdomen and pelvis on 16 and 16.), MRI (MRI of the left shoulder 

on 18. MRI of the right knee on 12/8/15. MRI of the brain on 13. MRI 

of the right ankle and 14.), PFT (18), Stress Testing (Negative 

Cardiolite Lexiscan on 11/15/16 with ejection fraction of 51%.)





Social & Family History





- Family History


HEENT: Reports: None


Cardiac: Reports: Blood Clots/VTE/DVT, CAD, Heart Failure, High Cholesterol, 

Hypertension, MI, Stent, Other (See Below)


Other Cardiac Family History: Maternal grandmother with fatal MI in her 70s. 

Father with cardiomyopathy and CHF. Paternal uncle with cardiomyopathy with 

fatal CHF at age 71. Paternal uncle with MI at age 50 with history of PTCA/

stents. Hypertension in father, paternal grandfather, maternal grandfather, 

maternal grandmother, maternal uncle and mother. Hyperlipidemia  in all family 

members as above. Paternal grandmother with DVT.


Respiratory: Reports: Asthma, COPD, Pneumothorax, Sleep Apnea, Other (See Below)


Other Respiratory Family Hisory: Paternal uncle COPD, sleep apnea, and 

pneumothorax with history of tobacco use. Another paternal uncle with COPD and 

possible asthma today with history of tobacco use. Father with COPD with 

history of tobacco use.


GI: Reports: None


: Reports: None


OBGYN: Reports: Recurrent Spontaneous 


Other OBGYN Family History: Maternal Grandmother with recurrent SAB.


Musculoskeletal: Reports: Arthritis, Osteoarthritis, RA, Other (See Below)


Other Musculoskeletal Family History: Father and maternal grandfather with 

rheumatoid arthritis. Maternal grandparents with osteoarthritis.


Neurological: Reports: Other (See Below)


Other Neurological Family History: Paternal grandmother with history of OBS.


Psychiatric: Reports: Anxiety, Depression, Psychosis, Schizophrenia, Other (See 

Below)


Other Psychiatric Family History: Maternal grandmother anxiety depression 

disorder. Maternal aunt 2 with history of psychosis. Brother with 

schizophrenia. Paternal uncle with history of alcohol abuse.


Endocrine/Metabolic: Reports: Diabetes, type II, IDDM, Other (See Below)


Other Endocrine/Metabolic Family History: Father with AODM. Paternal uncle and 

2 with IDDM. Obesity in father, maternal grandparents, brothers 2 paternal 

uncles 2, maternal uncle 1, and  maternal aunts 4


Hematologic: Reports: None


Immunologic: Reports: None


Dermatologic: Reports: None


Oncologic: Reports: Brain, Lung, Lymphoma, Pancreatic, Prostate, Other (See 

Below)


Other Oncologic Family History: Mother with fatal lung cancer at age 63 with 

history of tobacco use. Father with history of fatal brain tumor at age 60 with 

history of tobacco use. Paternal uncle with history of prostate cancer and 

fatal pancreatic cancer at age 61 with history of alcohol abuse. Maternal uncle 

with fatal lung cancer at age 49 with history of tobacco use. Maternal aunt 

with fatal lymphoma at age 72.





- Tobacco Use


Smoking Status *Q: Never Smoker





- Caffeine Use


Caffeine Use: Reports: Coffee





- Living Situation & Occupation


Living situation: Reports:  (. 2 children.), with Family (Wife)


Occupation: Disabled (Secondary to his arthritis since 2018.)





ED ROS GENERAL





- Review of Systems


Review Of Systems: Comprehensive ROS is negative, except as noted in HPI.





ED EXAM, GENERAL





- Physical Exam


Exam: See Below


Exam Limited By: No Limitations


General Appearance: Alert, WD/WN, Obese


Eye Exam: Bilateral Eye: Foreign Body, PERRL


Ears: Hearing Grossly Normal


Nose: No: Nasal Deformity, Nasal Swelling, Nasal Drainage


Throat/Mouth: Normal Lips, Normal Voice, No Airway Compromise


Head: Atraumatic, Normocephalic


Neck: Supple


Respiratory/Chest: No Respiratory Distress, Lungs Clear, Normal Breath Sounds, 

No Accessory Muscle Use, Chest Non-Tender


Cardiovascular: Regular Rate, Rhythm, No Murmur


GI/Abdominal: Normal Bowel Sounds, Soft, Non-Tender, Other (morbidly obese)


 (Male) Exam: Deferred


Rectal (Males) Exam: Deferred


Back Exam: No: CVA Tenderness (L), CVA Tenderness (R), Muscle Spasm, Paraspinal 

Tenderness, Vertebral Tenderness


Extremities: Normal Capillary Refill


Neurological: Alert, Oriented, Normal Cognition, Other (equal strength 

bilaterally)


Psychiatric: Normal Affect, Normal Mood


Skin Exam: Warm, Dry, Intact, Normal Color





Course





- Vital Signs


Last Recorded V/S: 





 Last Vital Signs











Temp  36.8 C   19 16:28


 


Pulse  56 L  19 17:39


 


Resp  20   19 17:39


 


BP  153/77 H  19 17:39


 


Pulse Ox  97   19 17:39














- Orders/Labs/Meds


Orders: 





 Active Orders 24 hr











 Category Date Time Status


 


 OCCULT BLOOD DIAGNOSTIC [OP] Stat Lab  19 16:38 Ordered


 


 Sodium Chloride 0.9% [Normal Saline] 1,000 ml Med  19 17:20 Ordered





 IV .BOLUS   


 


 Sodium Chloride 0.9% [Saline Flush] Med  19 17:19 Ordered





 10 ml FLUSH ASDIRECTED PRN   


 


 Saline Lock Insert [OM.PC] Routine Oth  19 17:19 Ordered








 Medication Orders





Sodium Chloride (Normal Saline)  1,000 mls @ 250 mls/hr IV .BOLUS ONE


   Stop: 19 21:19


   Last Admin: 19 17:23  Dose: 250 mls/hr


Sodium Chloride (Saline Flush)  10 ml FLUSH ASDIRECTED PRN


   PRN Reason: Keep Vein Open


   Last Admin: 19 17:22  Dose: 10 ml








Labs: 





 Laboratory Tests











  19 Range/Units





  16:35 17:00 17:00 


 


WBC  8.8    (4.0-10.2)  K/uL


 


RBC  3.32 L    (4.33-5.41)  M/uL


 


Hgb  9.2 L    (13.1-16.8)  g/dL


 


Hct  29.9 L    (39.0-49.0)  %


 


MCV  90.1    (84.0-98.0)  fL


 


MCH  27.7 L    (28.2-33.3)  pg


 


MCHC  30.8 L    (31.7-36.0)  g/dL


 


RDW  15.0 H    (11.2-14.1)  %


 


Plt Count  217    (150-350)  K/uL


 


Neut % (Auto)  81.8 H    (45.0-80.0)  %


 


Lymph % (Auto)  7.5 L    (10.0-50.0)  %


 


Mono % (Auto)  8.8    (2.0-14.0)  %


 


Eos % (Auto)  1.8    (0.0-5.0)  %


 


Baso % (Auto)  0.1    (0.0-2.0)  %


 


Neut # (Auto)  7.20 H    (1.40-7.00)  K/uL


 


Lymph # (Auto)  0.66    (0.50-3.50)  K/uL


 


Mono # (Auto)  0.77    (0.00-1.00)  K/uL


 


Eos # (Auto)  0.16    (0.00-0.50)  K/uL


 


Baso # (Auto)  0.01    (0.00-0.20)  K/uL


 


Sodium   140   (136-145)  mmol/L


 


Potassium   3.5   (3.5-5.1)  mmol/L


 


Chloride   106   ()  mmol/L


 


Carbon Dioxide   22.8   (21.0-32.0)  mmol/L


 


BUN   8   (7-18)  mg/dL


 


Creatinine   0.87   (0.51-1.17)  mg/dL


 


Est Cr Clr Drug Dosing   112.73   mL/min


 


Estimated GFR (MDRD)   > 60   mL/min


 


Glucose   95   ()  mg/dL


 


Lactic Acid    0.7  (0.4-2.0)  mmol/L


 


Calcium   8.7   (8.5-10.1)  mg/dL


 


Total Bilirubin   0.6   (0.2-1.0)  mg/dL


 


AST   14 L   (15-37)  U/L


 


ALT   20   (12-78)  U/L


 


Alkaline Phosphatase   108   ()  IU/L


 


Total Protein   7.8   (6.4-8.2)  g/dL


 


Albumin   3.5   (3.4-5.0)  g/dL











Meds: 





Medications











Generic Name Dose Route Start Last Admin





  Trade Name Freq  PRN Reason Stop Dose Admin


 


Sodium Chloride  1,000 mls @ 250 mls/hr  19 17:20  19 17:23





  Normal Saline  IV  19 21:19  250 mls/hr





  .BOLUS ONE   Administration





     





     





     





     


 


Sodium Chloride  10 ml  19 17:19  19 17:22





  Saline Flush  FLUSH   10 ml





  ASDIRECTED PRN   Administration





  Keep Vein Open   





     





     





     














Discontinued Medications














Generic Name Dose Route Start Last Admin





  Trade Name [place]  PRN Reason Stop Dose Admin


 


Morphine Sulfate  2 mg  19 17:23  19 17:25





  Morphine  IVPUSH  19 17:24  2 mg





  ONETIME ONE   Administration





     





     





     





     


 


Pantoprazole Sodium  80 mg  19 17:20  19 17:23





  Protonix Iv***  IVPUSH  19 17:21  80 mg





  .BOLUS ONE   Administration





     





     





     





     














- Re-Assessments/Exams


Free Text/Narrative Re-Assessment/Exam: 





19 17:59


Patient had large obviously black stool after arrival.  + for occult blood.


Vital signs stable.  Hgb 9.2.   Was around this range when seen twice in the ER 

over the past week so appears to be stable.  Patient usually runs around 9.5-

10. 


Given obvious large black stools and low baseline Hgb, it was felt appropriate 

to send the patient to [facility] where he could be evaluated by GI.


Discussed patient with  from [facility]. He accepted that patient for 

transfer.


Patient received IV Protonix along with NS bolus. Transfer performed once bed 

availability confirmed and ambulance available for transfer. 








It was noted that when patient informed of EMS transfer to [facility], he was 

rocking slightly back and forth and requested pain medication.


It was brought up that he denied having acute pain when he presented and during 

exam.  He then admitted he wanted pain medication for his chronic pain.


Significant other indicated some of Jose's issues recently include withdrawal 

from opiates and there appears to be concern with overuse of medications for 

chronic daily pain. He apparently was having hallucinations last week due to 

mixing oxycodone with his other regular meds per ER report. 


Recommend caution with opiate prescribing.  A single 2mg dose of MS was given 

in ER due to increased discomfort anticipated during EMS ride/patient's morbid 

obesity making cot uncomfortable.  














Departure





- Departure


Time of Disposition: 18:05


Disposition: DC/[facility]fer to [facility]


Condition: Good


Clinical Impression: 


 Dependency on pain medication





GI bleed


Qualifiers:


 GI bleed type/associated pathology: melena Qualified Code(s): K92.1 - Melena








- Discharge Information


*PRESCRIPTION DRUG MONITORING PROGRAM REVIEWED*: Not Applicable


*COPY OF PRESCRIPTION DRUG MONITORING REPORT IN PATIENT [facility]: Not Applicable


Referrals: 


Theodora La NP [Primary Care Provider] - 





- My Orders


Last 24 Hours: 





My Active Orders





19 16:38


OCCULT BLOOD DIAGNOSTIC [OP] Stat 





19 17:19


Sodium Chloride 0.9% [Saline Flush]   10 ml FLUSH ASDIRECTED PRN 


Saline Lock Insert [OM.PC] Routine 





19 17:20


Sodium Chloride 0.9% [Normal Saline] 1,000 ml IV .BOLUS 














- Assessment/Plan


Last 24 Hours: 





My Active Orders





19 16:38


OCCULT BLOOD DIAGNOSTIC [OP] Stat 





19 17:19


Sodium Chloride 0.9% [Saline Flush]   10 ml FLUSH ASDIRECTED PRN 


Saline Lock Insert [OM.PC] Routine 





19 17:20


Sodium Chloride 0.9% [Normal Saline] 1,000 ml IV .BOLUS

## 2019-11-19 NOTE — PCM.HP.2
H&P History of Present Illness





- General


Date of Service: 19


Admit Problem/Dx: 


Patient reports black stools x3 that developed today. 


Source of Information: Patient


History Limitations: Reports: No Limitations





- History of Present Illness


Initial Comments - Free Text/Narative: 





Patient comes to ER and reports having three stools that were black today. 

First two stools were normal color.  Says that he usually has 5-6 bowel 

movements daily.  No report of any new pain other than feeling a bit of 

discomfort in right abdomen during last bowel movement. Has been taking 

ibuprofen. This is 3rd ER visit for patient this past week. First visit was for 

leg pain. Next one was reportedly due to side effects from polypharmacy.  


No fevers/chills.  No other changes reported.


Eating well.  


No emesis/nausea.  





- Related Data


Allergies/Adverse Reactions: 


 Allergies











Allergy/AdvReac Type Severity Reaction Status Date / Time


 


celecoxib [From Celebrex] Allergy  unknown Verified 19 16:36


 


fentanyl Allergy  Agitation, Verified 19 16:36





   Hallucination  


 


gabapentin Allergy  Seizure Verified 19 16:36


 


hydrochlorothiazide Allergy  unknown Verified 19 16:36


 


naproxen [From Aleve] Allergy  Other Verified 19 16:36











Home Medications: 


 Home Meds





Multivitamin [Daily Vitamin] 1 tab PO QPM 01/27/15 [History]


Cyclobenzaprine [Flexeril] 10 mg PO TID PRN 18 [History]


Cholecalciferol (Vitamin D3) [Vitamin D3] 4 tab PO QPM 19 [History]


Cyanocobalamin (Vitamin B12) [Vitamin B12] 250 mcg PO QPM 19 [History]


oxyCODONE 30 mg PO Q4HR 19 [History]


Albuterol [Ventolin HFA] 1 gm INH Q4H PRN  inhaler 19 [Rx]


Amitriptyline [Elavil] 25 mg PO BEDTIME #90 19 [Rx]


Escitalopram [Lexapro] 20 mg PO DAILY #90 tablet 19 [Rx]


Folic Acid 1 mg PO QPM #90 tablet 19 [Rx]


Magnesium Oxide 400 mg PO QPM #180 tablet 19 [Rx]


Melatonin 6 mg PO BEDTIME PRN #100 tablet 19 [Rx]


Omeprazole 20 mg PO DAILY PRN #90 cap.cr 19 [Rx]


Thiamine [Vitamin B-1] 100 mg PO QPM #100 tablet 19 [Rx]


Triamcinolone Acetonide [Triamcinolone Acetonide 0.1% Crm] 1 dose TOP BID PRN #

1 tube 19 [Rx]


Acetaminophen [Tylenol Extra Strength] 1,000 mg PO BID@1200,1800 19 [

History]


Ferrous Sulfate 2 tab PO QPM 19 [History]


Ibuprofen 600 mg PO BID@1200,1800 19 [History]











Past Medical History


HEENT History: Reports: Impaired Vision, Other (See Below)


Other HEENT History: He uses OTC reading glasses. Dry eye syndrome in the left 

eye secondary to previous Bell's palsy?


Cardiovascular History: Reports: Hypertension, Syncope, Other (See Below)


Other Cardiovascular History: Patient denies hyperlipidemia despite history of 

LFTs elevation and obesity. Syncopal episode in 2016 secondary to postoperative 

bleeding from pannulectomy. History of d-dimer elevation.


Respiratory History: Reports: Bronchitis, Recurrent, COPD, Intubation, Previous

, Pneumonia, Recurrent, Sleep Apnea, Other (See Below)


Other Respiratory History: He is noncompliant with his CPAP.


Gastrointestinal History: Reports: Cholelithiasis, Diverticulosis, Gastritis, 

GERD, GI Bleed, Inflammatory Bowel Disease, PUD, Other (See Below)


Other Gastrointestinal History: History of upper GI bleed secondary to peptic 

ulcer disease and NSAID abuse in . Crohn's disease by blood work however 

negative follow-up colonoscopies by patient history as below. LFTs elevation 

likely secondary to fatty liver. Recurrent  diverticulitis.


Genitourinary History: Reports: Chronic Renal Insuffiency, Renal Calculus, 

Other (See Below)


Other Genitourinary History: Right-sided urolithiasis in  with spontaneous 

passage.


Musculoskeletal History: Reports: Arthritis, Back Pain, Chronic, Neck Pain, 

Chronic, Osteoarthritis, Other (See Below)


Other Musculoskeletal History: Ankylosis spondylitis. Proximal left fibular 

fracture in  not requiring surgery. Rib contusion on 3/13/15 with suspected 

fracture however negative x-rays. Chronic pain syndrome.


Neurological History: Reports: Headaches, Chronic, Migraines, Neuropathy, 

Peripheral, Other (See Below)


Other Neuro History: Left Sided Bell's palsy . Benign resting tremor.


Psychiatric History: Reports: Addiction, Anxiety, Depression, Hallucinations, 

Other (See Below)


Other Psychiatric History: Alcohol abuse as below with additional history of 

chronic narcotic use secondary to chronic pain syndrome. History of 

intermittent hallucinations and confusion secondary to previous narcotic 

medications, etc.


Endocrine/Metabolic History: Reports: Obesity/BMI 30+, Vitamin D Deficiency, 

Other (See Below)


Other Endocrine/Metabolic History: Hyponatremia. Hypoglycemia.


Hematologic History: Reports: Anemia, B12 Deficiency, Blood Transfusion(s), 

Iron Deficiency, Other (See Below)


Other Hematologic History: Patient with 7 units of packed red blood cells 

transfused secondary postoperative bleeding 2016.


Immunologic History: Reports: None


Oncologic (Cancer) History: Reports: None


Dermatologic History: Reports: Angiodema, Eczema, Other (See Below)


Other Dermatologic History: Angioedema at age 10 with additional episode on 3/26

/19 of unknown etiology.





- Infectious Disease History


Infectious Disease History: Reports: Chicken Pox





- Past Surgical History


Head Surgeries/Procedures: Reports: None


HEENT Surgical History: Reports: Oral Surgery, Other (See Below)


Other HEENT Surgeries/Procedures: [place] teeth extraction 3 . Additional 

teeth extractions.


Cardiovascular Surgical History: Reports: Varicose


Other Cardiovascular Surgeries/Procedures: Proximal left leg varicose veins 

removal at time of pannulectomy.


Respiratory Surgical History: Reports: None


GI Surgical History: Reports: Appendectomy, Bariatric Procedure, Cholecystectomy

, Colonoscopy, Hernia, Inguinal, Other (See Below)


Other GI Surgeries/Procedures: Laparoscopic appendectomy on 1/28/15. Mariya-en-Y 

gastric bypass in  with with concomitant cholecystectomy. Pannulectomy 

initially on 16 with subsequent postoperative bleeding requiring hematoma 

evacuation and blood transfusion on 16. Bilateral inguinal hernia repair 

including mesh placement on 3/24/15. Last colonoscopy in about . 

Hemorrhoidectomy 2 initially in  and then in 2017.


Male  Surgical History: Reports: Circumcision, Other (See Below)


Other Male  Surgeries/Procedures: Circumcision as an infant.


Endocrine Surgical History: Reports: None


Neurological Surgical History: Reports: None


Musculoskeletal Surgical History: Reports: Arthroscopic Knee, Arthroscopic 

Procedure, Carpal Tunnel, Shoulder Surgery, Other (See Below)


Other Musculoskeletal Surgeries/Procedures:: Right carpal tunnel release in 

about  with left carpal tunnel release in 2017. Right wrist ganglion 

excision 2 in  and . Left shoulder open rotator cuff repair in 2017. Right knee laparoscopic medial meniscus repair in 2015.  Left 

femur ORIF


Dermatological Surgical History: Reports: Plastic Surgical Reconstruction/Repair

, Other (See Below)





- Past Imaging History


Past Imaging History: Reports: Cardiac Echo (Negative echocardiogram on 11/15/

16 with ejection fraction of 60%.), CAT Scan (CT of the head on 3/23/18. CT of 

the abdomen and pelvis on 16 and 16.), MRI (MRI of the left shoulder 

on 18. MRI of the right knee on 12/8/15. MRI of the brain on 13. MRI 

of the right ankle and 14.), PFT (18), Stress Testing (Negative 

Cardiolite Lexiscan on 11/15/16 with ejection fraction of 51%.)





Social & Family History





- Family History


HEENT: Reports: None


Cardiac: Reports: Blood Clots/VTE/DVT, CAD, Heart Failure, High Cholesterol, 

Hypertension, MI, Stent, Other (See Below)


Other Cardiac Family History: Maternal grandmother with fatal MI in her 70s. 

Father with cardiomyopathy and CHF. Paternal uncle with cardiomyopathy with 

fatal CHF at age 71. Paternal uncle with MI at age 50 with history of PTCA/

stents. Hypertension in father, paternal grandfather, maternal grandfather, 

maternal grandmother, maternal uncle and mother. Hyperlipidemia  in all family 

members as above. Paternal grandmother with DVT.


Respiratory: Reports: Asthma, COPD, Pneumothorax, Sleep Apnea, Other (See Below)


Other Respiratory Family Hisory: Paternal uncle COPD, sleep apnea, and 

pneumothorax with history of tobacco use. Another paternal uncle with COPD and 

possible asthma today with history of tobacco use. Father with COPD with 

history of tobacco use.


GI: Reports: None


: Reports: None


OBGYN: Reports: Recurrent Spontaneous 


Other OBGYN Family History: Maternal Grandmother with recurrent SAB.


Musculoskeletal: Reports: Arthritis, Osteoarthritis, RA, Other (See Below)


Other Musculoskeletal Family History: Father and maternal grandfather with 

rheumatoid arthritis. Maternal grandparents with osteoarthritis.


Neurological: Reports: Other (See Below)


Other Neurological Family History: Paternal grandmother with history of OBS.


Psychiatric: Reports: Anxiety, Depression, Psychosis, Schizophrenia, Other (See 

Below)


Other Psychiatric Family History: Maternal grandmother anxiety depression 

disorder. Maternal aunt 2 with history of psychosis. Brother with 

schizophrenia. Paternal uncle with history of alcohol abuse.


Endocrine/Metabolic: Reports: Diabetes, type II, IDDM, Other (See Below)


Other Endocrine/Metabolic Family History: Father with AODM. Paternal uncle and 

2 with IDDM. Obesity in father, maternal grandparents, brothers 2 paternal 

uncles 2, maternal uncle 1, and  maternal aunts 4


Hematologic: Reports: None


Immunologic: Reports: None


Dermatologic: Reports: None


Oncologic: Reports: Brain, Lung, Lymphoma, Pancreatic, Prostate, Other (See 

Below)


Other Oncologic Family History: Mother with fatal lung cancer at age 63 with 

history of tobacco use. Father with history of fatal brain tumor at age 60 with 

history of tobacco use. Paternal uncle with history of prostate cancer and 

fatal pancreatic cancer at age 61 with history of alcohol abuse. Maternal uncle 

with fatal lung cancer at age 49 with history of tobacco use. Maternal aunt 

with fatal lymphoma at age 72.





- Caffeine Use


Caffeine Use: Reports: Coffee





- Living Situation & Occupation


Living situation: Reports:  (. 2 children.), with Family (Wife)


Occupation: Disabled (Secondary to his arthritis since 2018.)





H&P Review of Systems





- Review of Systems:


Review Of Systems: See Below


General: Denies: Fever, Chills, Weakness, Diaphoresis, Weight Loss


HEENT: Reports: No Symptoms (no acute changes)


Pulmonary: Denies: Shortness of Breath, Pleuritic Chest Pain, Cough, Hemoptysis


Cardiovascular: Denies: Chest Pain, Lightheadedness, Syncope


Gastrointestinal: Reports: Abdominal Pain





Exam





- Vital Signs


Vital Signs: 


 Last Vital Signs











Temp  36.8 C   19 16:28


 


Pulse  55 L  19 16:28


 


Resp  24 H  19 16:28


 


BP  157/75 H  19 16:28


 


Pulse Ox  98   19 16:28











Weight: 167.829 kg





- Patient Data


Lab Results Last 24 hrs: 


 Laboratory Results - last 24 hr











  19 Range/Units





  16:35 


 


WBC  8.8  (4.0-10.2)  K/uL


 


RBC  3.32 L  (4.33-5.41)  M/uL


 


Hgb  9.2 L  (13.1-16.8)  g/dL


 


Hct  29.9 L  (39.0-49.0)  %


 


MCV  90.1  (84.0-98.0)  fL


 


MCH  27.7 L  (28.2-33.3)  pg


 


MCHC  30.8 L  (31.7-36.0)  g/dL


 


RDW  15.0 H  (11.2-14.1)  %


 


Plt Count  217  (150-350)  K/uL


 


Neut % (Auto)  81.8 H  (45.0-80.0)  %


 


Lymph % (Auto)  7.5 L  (10.0-50.0)  %


 


Mono % (Auto)  8.8  (2.0-14.0)  %


 


Eos % (Auto)  1.8  (0.0-5.0)  %


 


Baso % (Auto)  0.1  (0.0-2.0)  %


 


Neut # (Auto)  7.20 H  (1.40-7.00)  K/uL


 


Lymph # (Auto)  0.66  (0.50-3.50)  K/uL


 


Mono # (Auto)  0.77  (0.00-1.00)  K/uL


 


Eos # (Auto)  0.16  (0.00-0.50)  K/uL


 


Baso # (Auto)  0.01  (0.00-0.20)  K/uL











Result Diagrams: 


 19 16:35





Phil Results Last 24 hrs: 


 Microbiology











 19 16:35 Stool Occult Blood (PHIL) - Final





 Stool / Feces 











Orders Last 24hrs: 


 Active Orders 24 hr











 Category Date Time Status


 


 COMPREHENSIVE METABOLIC PN,CMP [CHEM] Stat Lab  19 16:57 Ordered


 


 LACTIC ACID [CHEM] Stat Lab  19 16:57 Ordered


 


 OCCULT BLOOD DIAGNOSTIC [OP] Stat Lab  19 16:38 Ordered


 


 Sodium Chloride 0.9% [Normal Saline] 1,000 ml Med  19 17:20 Ordered





 IV .BOLUS   


 


 Sodium Chloride 0.9% [Saline Flush] Med  19 17:19 Ordered





 10 ml FLUSH ASDIRECTED PRN   


 


 Saline Lock Insert [OM.PC] Routine Oth  19 17:19 Ordered








 Medication Orders





Sodium Chloride (Normal Saline)  1,000 mls @ 250 mls/hr IV .BOLUS ONE


   Stop: 19 21:19


Sodium Chloride (Saline Flush)  10 ml FLUSH ASDIRECTED PRN


   PRN Reason: Keep Vein Open